# Patient Record
Sex: FEMALE | Race: ASIAN | NOT HISPANIC OR LATINO | Employment: FULL TIME | URBAN - METROPOLITAN AREA
[De-identification: names, ages, dates, MRNs, and addresses within clinical notes are randomized per-mention and may not be internally consistent; named-entity substitution may affect disease eponyms.]

---

## 2017-01-20 ENCOUNTER — TRANSFERRED RECORDS (OUTPATIENT)
Dept: HEALTH INFORMATION MANAGEMENT | Facility: CLINIC | Age: 42
End: 2017-01-20

## 2017-02-03 ENCOUNTER — OFFICE VISIT (OUTPATIENT)
Dept: FAMILY MEDICINE | Facility: CLINIC | Age: 42
End: 2017-02-03
Payer: COMMERCIAL

## 2017-02-03 VITALS
DIASTOLIC BLOOD PRESSURE: 72 MMHG | HEART RATE: 65 BPM | BODY MASS INDEX: 18.96 KG/M2 | SYSTOLIC BLOOD PRESSURE: 112 MMHG | OXYGEN SATURATION: 99 % | HEIGHT: 63 IN | RESPIRATION RATE: 18 BRPM | TEMPERATURE: 97.9 F | WEIGHT: 107 LBS

## 2017-02-03 DIAGNOSIS — K13.70: Primary | ICD-10-CM

## 2017-02-03 PROCEDURE — 99214 OFFICE O/P EST MOD 30 MIN: CPT | Performed by: FAMILY MEDICINE

## 2017-02-03 NOTE — PATIENT INSTRUCTIONS
1. You have a collection of cholesterol in a small gland in the skin or mucosa of the right vaginal opening    And it's gotten big enough to be irritating and probably got bumped and is infected     2. Warm soaks for 10 min 2 times a day  Press it dry     2. Ask insurance if will cost to remove and can schedule

## 2017-02-03 NOTE — MR AVS SNAPSHOT
"              After Visit Summary   2/3/2017    Albania Forrest    MRN: [id number]           Patient Information     Date Of Birth          1975        Visit Information        Provider Department      2/3/2017 9:40 AM Liana Mar MD [facility]        Today's Diagnoses     Papule of mucosa Rt vaginal orifice     -  1       Care Instructions    1. You have a collection of cholesterol in a small gland in the skin or mucosa of the right vaginal opening    And it's gotten big enough to be irritating and probably got bumped and is infected     2. Warm soaks for 10 min 2 times a day  Press it dry     2. Ask insurance if will cost to remove and can schedule         Follow-ups after your visit        Who to contact     If you have questions or need follow up information about today's clinic visit or your schedule please contact [facility] directly at [phone].  Normal or non-critical lab and imaging results will be communicated to you by MyChart, letter or phone within 4 business days after the clinic has received the results. If you do not hear from us within 7 days, please contact the clinic through MyChart or phone. If you have a critical or abnormal lab result, we will notify you by phone as soon as possible.  Submit refill requests through [employer] or call your pharmacy and they will forward the refill request to us. Please allow 3 business days for your refill to be completed.          Additional Information About Your Visit        Care EveryWhere ID     This is your Care EveryWhere ID. This could be used by other organizations to access your [facility] medical records  [id number]        Your Vitals Were     Pulse Temperature Respirations    65 97.9  F (36.6  C) (Tympanic) 18    Height BMI (Body Mass Index) Pulse Oximetry    5' 2.5\" (1.588 m) 19.25 kg/m2 99%    Last Period Breastfeeding?       (LMP Unknown) No        Blood Pressure from " Last 3 Encounters:   02/03/17 112/72   12/10/16 106/60   06/02/16 100/60    Weight from Last 3 Encounters:   02/03/17 107 lb (48.535 kg)   12/10/16 108 lb (48.988 kg)   06/02/16 107 lb (48.535 kg)              Today, you had the following     No orders found for display       Primary Care Provider    None Specified       No primary provider on file.        Thank you!     Thank you for choosing [facility]  for your care. Our goal is always to provide you with excellent care. Hearing back from our patients is one way we can continue to improve our services. Please take a few minutes to complete the written survey that you may receive in the mail after your visit with us. Thank you!             Your Updated Medication List - Protect others around you: Learn how to safely use, store and throw away your medicines at www.disposemymeds.org.          This list is accurate as of: 2/3/17 10:14 AM.  Always use your most recent med list.                   Brand Name Dispense Instructions for use    amoxicillin 500 MG capsule    AMOXIL    30 capsule    Take 1 capsule (500 mg) by mouth 3 times daily       sulfamethoxazole-trimethoprim 400-80 MG per tablet    BACTRIM/SEPTRA    6 tablet    Take 1 tablet by mouth 2 times daily       vitamin D 2000 UNITS Caps      Take 1 capsule by mouth daily

## 2017-02-03 NOTE — PROGRESS NOTES
"  SUBJECTIVE:                                                    Albania Forrest is a 41 year old female who presents to clinic today for the following health issues:   is here   Vaginal Symptoms      Duration: x 1 week    Description  Lesion in vaginal area- MARBLE  SIZE -tender     Intensity:  mild    Accompanying signs and symptoms (fever/dysuria/abdominal or back pain): Pain    History  Sexually active: yes, single partner, contraception not required  Possibility of pregnancy: No  Recent antibiotic use: no     Precipitating or alleviating factors: None    Therapies tried and outcome: none   Outcome: NA     Problem list and histories reviewed & adjusted, as indicated.  Additional history: as documented    Labs reviewed in EPIC  Problem list, Medication list, Allergies, and Medical/Social/Surgical histories reviewed in [facility] and updated as appropriate.    ROS:  C: NEGATIVE for fever, chills, change in weight  I: NEGATIVE for worrisome rashes, moles or lesions  E: NEGATIVE for vision changes or irritation  E/M: NEGATIVE for ear, mouth and throat problems  R: NEGATIVE for significant cough or SOB  B: NEGATIVE for masses, tenderness or discharge  CV: NEGATIVE for chest pain, palpitations or peripheral edema  GI: NEGATIVE for nausea, abdominal pain, heartburn, or change in bowel habits  : NEGATIVE for frequency, dysuria, or hematuria-- tender cyst on Rt labia   M: NEGATIVE for significant arthralgias or myalgia  N: NEGATIVE for weakness, dizziness or paresthesias  E: NEGATIVE for temperature intolerance, skin/hair changes  H: NEGATIVE for bleeding problems  P: NEGATIVE for changes in mood or affect    OBJECTIVE:                                                    /72 mmHg  Pulse 65  Temp(Src) 97.9  F (36.6  C) (Tympanic)  Resp 18  Ht 5' 2.5\" (1.588 m)  Wt 107 lb (48.535 kg)  BMI 19.25 kg/m2  SpO2 99%  LMP  (LMP Unknown)  Breastfeeding? No  Body mass index is 19.25 kg/(m^2).  GENERAL: healthy, alert " and no distress  EYES: Eyes grossly normal to inspection, PERRL and conjunctivae and sclerae normal  RESP: lungs clear to auscultation - no rales, rhonchi or wheezes  ABDOMEN: soft, nontender, no hepatosplenomegaly, no masses and bowel sounds normal   (female): normal female external genitalia, normal urethral meatus, vaginal mucosa pink, moist, well rugated,  Discharge--5-6 mm firm round slightly yellow under thinned skin on Rt ant vag orifice - slightly red and tender   MS: no gross musculoskeletal defects noted, no edema  SKIN: no suspicious lesions or rashes  NEURO: Normal strength and tone, mentation intact and speech normal  PSYCH: mentation appears normal, affect normal/bright    Diagnostic Test Results:  none      ASSESSMENT/PLAN:                                                              ICD-10-CM    1. Papule of mucosa Rt vaginal orifice  R23.8        Patient Instructions   1. You have a collection of cholesterol in a small gland in the skin or mucosa of the right vaginal opening    And it's gotten big enough to be irritating and probably got bumped and is infected     2. Warm soaks for 10 min 2 times a day  Press it dry     2. Ask insurance if will cost to remove and can schedule     discussed and explained thoroughly to  and pt  Re the etiology, and probable damage of cyst with intercourse causing the tenderness     Liana Mar MD  [facility]

## 2017-02-03 NOTE — NURSING NOTE
"Chief Complaint   Patient presents with     Vaginal Problem     /72 mmHg  Pulse 65  Temp(Src) 97.9  F (36.6  C) (Tympanic)  Resp 18  Ht 5' 2.5\" (1.588 m)  Wt 107 lb (48.535 kg)  BMI 19.25 kg/m2  SpO2 99%  LMP  (LMP Unknown)  Breastfeeding? No Estimated body mass index is 19.25 kg/(m^2) as calculated from the following:    Height as of this encounter: 5' 2.5\" (1.588 m).    Weight as of this encounter: 107 lb (48.535 kg).  BP completed using cuff size: roxie Salcedo CMA    There are no preventive care reminders to display for this patient.  Health Maintenance reviewed at today's visit patient asked to schedule/complete:   None, Health Maintenance up to date.      "

## 2017-04-28 ENCOUNTER — TRANSFERRED RECORDS (OUTPATIENT)
Dept: HEALTH INFORMATION MANAGEMENT | Facility: CLINIC | Age: 42
End: 2017-04-28

## 2017-04-28 LAB — HEP C HIM: NORMAL

## 2017-06-09 ENCOUNTER — HOSPITAL ENCOUNTER (OUTPATIENT)
Dept: MAMMOGRAPHY | Facility: CLINIC | Age: 42
Discharge: HOME | End: 2017-06-09
Attending: FAMILY MEDICINE | Admitting: FAMILY MEDICINE
Payer: COMMERCIAL

## 2017-06-09 ENCOUNTER — OFFICE VISIT (OUTPATIENT)
Dept: FAMILY MEDICINE | Facility: CLINIC | Age: 42
End: 2017-06-09
Payer: COMMERCIAL

## 2017-06-09 VITALS
SYSTOLIC BLOOD PRESSURE: 108 MMHG | HEIGHT: 63 IN | TEMPERATURE: 97.2 F | OXYGEN SATURATION: 99 % | HEART RATE: 58 BPM | RESPIRATION RATE: 16 BRPM | WEIGHT: 107 LBS | BODY MASS INDEX: 18.96 KG/M2 | DIASTOLIC BLOOD PRESSURE: 82 MMHG

## 2017-06-09 DIAGNOSIS — B18.2 CHRONIC HEPATITIS C WITHOUT HEPATIC COMA (H): Chronic | ICD-10-CM

## 2017-06-09 DIAGNOSIS — Z13.220 LIPID SCREENING: ICD-10-CM

## 2017-06-09 DIAGNOSIS — Z00.00 ROUTINE GENERAL MEDICAL EXAMINATION AT A HEALTH CARE FACILITY: Primary | ICD-10-CM

## 2017-06-09 DIAGNOSIS — E46 PROTEIN-CALORIE MALNUTRITION (H): ICD-10-CM

## 2017-06-09 DIAGNOSIS — Z82.49 FAMILY HISTORY OF ISCHEMIC HEART DISEASE: ICD-10-CM

## 2017-06-09 DIAGNOSIS — Z12.31 VISIT FOR SCREENING MAMMOGRAM: ICD-10-CM

## 2017-06-09 LAB
CHOLEST SERPL-MCNC: 225 MG/DL
HDLC SERPL-MCNC: 79 MG/DL
LDLC SERPL CALC-MCNC: 124 MG/DL
NONHDLC SERPL-MCNC: 146 MG/DL
TRIGL SERPL-MCNC: 110 MG/DL

## 2017-06-09 PROCEDURE — 80061 LIPID PANEL: CPT | Performed by: FAMILY MEDICINE

## 2017-06-09 PROCEDURE — 36415 COLL VENOUS BLD VENIPUNCTURE: CPT | Performed by: FAMILY MEDICINE

## 2017-06-09 PROCEDURE — 99396 PREV VISIT EST AGE 40-64: CPT | Performed by: FAMILY MEDICINE

## 2017-06-09 PROCEDURE — G0202 SCR MAMMO BI INCL CAD: HCPCS

## 2017-06-09 NOTE — MR AVS SNAPSHOT
After Visit Summary   6/9/2017    Albania Forrest    MRN: [id number]           Patient Information     Date Of Birth          1975        Visit Information        Provider Department      6/9/2017 8:40 AM Liana Mar MD [facility]        Today's Diagnoses     Routine general medical examination at a health care facility    -  1    Chronic hepatitis C without hepatic coma (H)        Protein-calorie malnutrition (H)-stable          Care Instructions      Preventive Health Recommendations  Female Ages 40 to 49    Yearly exam:     See your health care provider every year in order to  1. Review health changes.   2. Discuss preventive care.    3. Review your medicines if your doctor prescribed any.      Get a Pap test every three years (unless you have an abnormal result and your provider advises testing more often).      If you get Pap tests with HPV test, you only need to test every 5 years, unless you have an abnormal result. You do not need a Pap test if your uterus was removed (hysterectomy) and you have not had cancer.      You should be tested each year for STDs (sexually transmitted diseases), if you're at risk.       Ask your doctor if you should have a mammogram.      Have a colonoscopy (test for colon cancer) if someone in your family has had colon cancer or polyps before age 50.       Have a cholesterol test every 5 years.       Have a diabetes test (fasting glucose) after age 45. If you are at risk for diabetes, you should have this test every 3 years.    Shots: Get a flu shot each year. Get a tetanus shot every 10 years.     Nutrition:     Eat at least 5 servings of fruits and vegetables each day.    Eat whole-grain bread, whole-wheat pasta and brown rice instead of white grains and rice.    Talk to your provider about Calcium and Vitamin D.     Lifestyle    Exercise at least 150 minutes a week (an average of 30 minutes a day, 5 days a week).  This will help you control your weight and prevent disease.    Limit alcohol to one drink per day.    No smoking.     Wear sunscreen to prevent skin cancer.    See your dentist every six months for an exam and cleaning.    1. Please do your breast exam every mo, when you  Change the  calendar page or set an alarm on your cell phone Do a  visual check for dimples, inversion or indentation or any different position of the nipple Feel manually  for any 1cm or larger  size mass ie about the size of an almond Be sure to cover the entire area of both breasts : this extends back to the back on either side and from the collar bone to the bottom of the breasts where you can begin to feel ribs.    2. . Schedule your mammo at [facility]  At [address] [place] [address] at [phone]              Follow-ups after your visit        Your next 10 appointments already scheduled     Jun 09, 2017 11:00 AM CDT   MA SCREENING DIGITAL BILATERAL with SHBCMA2   [facility] ([facility])    [address] [place] [address], [address]  [facility] [place] [address] [phone]           Do not use any powder, lotion or deodorant under your arms or on your breast. If you do, we will ask you to remove it before your exam.  Wear comfortable, two-piece clothing.  If you have any allergies, tell your care team.  Bring any previous mammograms from other facilities or have them mailed to the breast center. This mammogram location, [facility], now offers 3D mammography. It doesn't replace a screening mammogram and can be done with a regular screening mammogram. It is optional and not all insurances will pay for it. 3D mammography is a special kind of mammogram that produces a three-dimensional image of the breast by using low dose-xrays. 3D allows the radiologist to see the breast tissue differently from 2D, which reduces the chance of repeat testing due to overlapping breast tissue. If you are  "interested in have a 3D mammogram, please check with your insurance before you arrive for your exam. On the day of your exam you will be asked if you would like 3D imaging.              Who to contact     If you have questions or need follow up information about today's clinic visit or your schedule please contact [facility] directly at [phone].  Normal or non-critical lab and imaging results will be communicated to you by MyChart, letter or phone within 4 business days after the clinic has received the results. If you do not hear from us within 7 days, please contact the clinic through MyChart or phone. If you have a critical or abnormal lab result, we will notify you by phone as soon as possible.  Submit refill requests through [employer] or call your pharmacy and they will forward the refill request to us. Please allow 3 business days for your refill to be completed.          Additional Information About Your Visit        Care EveryWhere ID     This is your Care EveryWhere ID. This could be used by other organizations to access your [facility] medical records  [id number]        Your Vitals Were     Pulse Temperature Respirations Height Last Period Pulse Oximetry    58 97.2  F (36.2  C) (Tympanic) 16 5' 2.5\" (1.588 m) (LMP Unknown) 99%    Breastfeeding? BMI (Body Mass Index)                No 19.26 kg/m2           Blood Pressure from Last 3 Encounters:   06/09/17 108/82   02/03/17 112/72   12/10/16 106/60    Weight from Last 3 Encounters:   06/09/17 107 lb (48.5 kg)   02/03/17 107 lb (48.5 kg)   12/10/16 108 lb (49 kg)              Today, you had the following     No orders found for display       Primary Care Provider    None Specified       No primary provider on file.        Thank you!     Thank you for choosing [facility]  for your care. Our goal is always to provide you with excellent care. Hearing back from our patients is one way we can continue " to improve our services. Please take a few minutes to complete the written survey that you may receive in the mail after your visit with us. Thank you!             Your Updated Medication List - Protect others around you: Learn how to safely use, store and throw away your medicines at www.disposemymeds.org.          This list is accurate as of: 6/9/17  9:18 AM.  Always use your most recent med list.                   Brand Name Dispense Instructions for use    vitamin D 2000 UNITS Caps      Take 1 capsule by mouth daily

## 2017-06-09 NOTE — LETTER
"    6/13/2017     Albania Forrest  [address] [place] [address]    Dear [place]ia:    Here are the results of your latest lipid tests:    LDL Cholesterol Calculated   Date Value Ref Range Status   06/09/2017 124 (H) <100 mg/dL Final     Comment:     Above desirable:  100-129 mg/dl   Borderline High:  130-159 mg/dL   High:             160-189 mg/dL   Very high:       >189 mg/dl     ]  HDL Cholesterol   Date Value Ref Range Status   06/09/2017 79 >49 mg/dL Final   ]  Triglycerides   Date Value Ref Range Status   06/09/2017 110 <150 mg/dL Final     Comment:     Fasting specimen   ]  Cholesterol   Date Value Ref Range Status   06/09/2017 225 (H) <200 mg/dL Final     Comment:     Desirable:       <200 mg/dl   ]    LDL is the \"bad\" cholesterol linked to heart disease and stroke.   HDL is the \"good\" choesterol and when it is high, it decreases the risk for above problems.  ###enclosed is Mediterranean Diet####  Follow a low-fat, low-cholesterol diet and get regular exercise.  Please feel free to call the clinic if you have any questions.    Sincerely,      Liana Mar   "

## 2017-06-09 NOTE — NURSING NOTE
"Chief Complaint   Patient presents with     Physical     /82  Pulse 58  Temp 97.2  F (36.2  C) (Tympanic)  Resp 16  Ht 5' 2.5\" (1.588 m)  Wt 107 lb (48.5 kg)  LMP  (LMP Unknown)  SpO2 99%  Breastfeeding? No  BMI 19.26 kg/m2 Estimated body mass index is 19.26 kg/(m^2) as calculated from the following:    Height as of this encounter: 5' 2.5\" (1.588 m).    Weight as of this encounter: 107 lb (48.5 kg).  BP completed using cuff size: regular   Jessika Salcedo CMA    There are no preventive care reminders to display for this patient.  Health Maintenance reviewed at today's visit patient asked to schedule/complete:   None, Health Maintenance up to date.    "

## 2017-06-09 NOTE — PROGRESS NOTES
SUBJECTIVE:     CC: [place]ia Forrest is an 42 year old woman who presents for preventive health visit.     Healthy Habits:    Do you get at least three servings of calcium containing foods daily (dairy, green leafy vegetables, etc.)? yes    Amount of exercise or daily activities, outside of work: 5 day(s) per week    Problems taking medications regularly No    Medication side effects: No    Have you had an eye exam in the past two years? yes    Do you see a dentist twice per year? yes    Do you have sleep apnea, excessive snoring or daytime drowsiness?no        HEPATITIS C     -+ test in 2015   -Rx [facility] per [facility]   -5-17 : neg LFTs and 0 viral count!!   -final status: cured       Today's PHQ-2 Score:   PHQ-2 ( 1999 Pf[employer]) 6/9/2017 2/3/2017   Q1: Little interest or pleasure in doing things 0 0   Q2: Feeling down, depressed or hopeless 0 0   PHQ-2 Score 0 0       Abuse: Current or Past(Physical, Sexual or Emotional)- No  Do you feel safe in your environment - Yes    Social History   Substance Use Topics     Smoking status: Never Smoker     Smokeless tobacco: Never Used     Alcohol use No     The patient does not drink >3 drinks per day nor >7 drinks per week.    Recent Labs   Lab Test  04/27/13   1017  02/24/11   1734   CHOL  173  172   HDL  53  51   LDL  103  108.2*   TRIG  87  64   CHOLHDLRATIO  3.3   --        Reviewed orders with patient.  Reviewed health maintenance and updated orders accordingly - Yes    Mammo Decision Support:  Patient under age 50, mutual decision reflected in health maintenance.      Pertinent mammograms are reviewed under the imaging tab.  History of abnormal Pap smear: NO - age 30- 65 PAP every 3 years recommended    Reviewed and updated as needed this visit by clinical staff  Tobacco  Allergies  Meds  Problems  Med Hx  Surg Hx  Fam Hx  Soc Hx          Reviewed and updated as needed this visit by Provider            ROS:  C: NEGATIVE for fever, chills, change in weight  I:  NEGATIVE for worrisome rashes, moles or lesions  E: NEGATIVE for vision changes or irritation  ENT: NEGATIVE for ear, mouth and throat problems  R: NEGATIVE for significant cough or SOB  B: NEGATIVE for masses, tenderness or discharge  CV: NEGATIVE for chest pain, palpitations or peripheral edema  GI: NEGATIVE for nausea, abdominal pain, heartburn, or change in bowel habits  : NEGATIVE for unusual urinary or vaginal symptoms. Periods are regular.  M: NEGATIVE for significant arthralgias or myalgia  N: NEGATIVE for weakness, dizziness or paresthesias  P: NEGATIVE for changes in mood or affect    Problem list, Medication list, Allergies, and Medical/Social/Surgical histories reviewed in [facility] and updated as appropriate.  Labs reviewed in EPIC  OBJECTIVE:     LMP  (LMP Unknown)  EXAM:  GENERAL: healthy, alert, no distress and under wt   EYES: Eyes grossly normal to inspection, PERRL and conjunctivae and sclerae normal  NECK: no adenopathy, no asymmetry, masses, or scars and thyroid normal to palpation  RESP: lungs clear to auscultation - no rales, rhonchi or wheezes  BREAST: normal without masses, tenderness or nipple discharge and no palpable axillary masses or adenopathy  CV: regular rate and rhythm, normal S1 S2, no S3 or S4, no murmur, click or rub, no peripheral edema and peripheral pulses strong  ABDOMEN: soft, nontender, no hepatosplenomegaly, no masses and bowel sounds normal  MS: no gross musculoskeletal defects noted, no edema  SKIN: no suspicious lesions or rashes  NEURO: Normal strength and tone, mentation intact and speech normal  PSYCH: mentation appears normal, affect normal/bright  LYMPH: no cervical, supraclavicular, axillary, or inguinal adenopathy    ASSESSMENT/PLAN:         ICD-10-CM    1. Routine general medical examination at a health care facility Z00.00    2. Chronic hepatitis C without hepatic coma (H) B18.2    3. Protein-calorie malnutrition (H)-stable E46        COUNSELING:   Reviewed  "preventive health counseling, as reflected in patient instructions       Regular exercise       Healthy diet/nutrition       Vision screening         reports that she has never smoked. She has never used smokeless tobacco.    Estimated body mass index is 19.26 kg/(m^2) as calculated from the following:    Height as of 2/3/17: 5' 2.5\" (1.588 m).    Weight as of 2/3/17: 107 lb (48.5 kg).   Weight management plan noted, stable and monitoring     Patient Instructions     Preventive Health Recommendations  Female Ages 40 to 49    Yearly exam:     See your health care provider every year in order to  1. Review health changes.   2. Discuss preventive care.    3. Review your medicines if your doctor prescribed any.      Get a Pap test every three years (unless you have an abnormal result and your provider advises testing more often).      If you get Pap tests with HPV test, you only need to test every 5 years, unless you have an abnormal result. You do not need a Pap test if your uterus was removed (hysterectomy) and you have not had cancer.      You should be tested each year for STDs (sexually transmitted diseases), if you're at risk.       Ask your doctor if you should have a mammogram.      Have a colonoscopy (test for colon cancer) if someone in your family has had colon cancer or polyps before age 50.       Have a cholesterol test every 5 years.       Have a diabetes test (fasting glucose) after age 45. If you are at risk for diabetes, you should have this test every 3 years.    Shots: Get a flu shot each year. Get a tetanus shot every 10 years.     Nutrition:     Eat at least 5 servings of fruits and vegetables each day.    Eat whole-grain bread, whole-wheat pasta and brown rice instead of white grains and rice.    Talk to your provider about Calcium and Vitamin D.     Lifestyle    Exercise at least 150 minutes a week (an average of 30 minutes a day, 5 days a week). This will help you control your weight and prevent " disease.    Limit alcohol to one drink per day.    No smoking.     Wear sunscreen to prevent skin cancer.    See your dentist every six months for an exam and cleaning.    1. Please do your breast exam every mo, when you  Change the  calendar page or set an alarm on your cell phone Do a  visual check for dimples, inversion or indentation or any different position of the nipple Feel manually  for any 1cm or larger  size mass ie about the size of an almond Be sure to cover the entire area of both breasts : this extends back to the back on either side and from the collar bone to the bottom of the breasts where you can begin to feel ribs.    2. . Schedule your mammo at [facility]  At [address] at [phone]            Counseling Resources:  ATP IV Guidelines  Pooled Cohorts Equation Calculator  Breast Cancer Risk Calculator  FRAX Risk Assessment  ICSI Preventive Guidelines  Dietary Guidelines for Americans, 2010  USDA's MyPlate  ASA Prophylaxis  Lung CA Screening    Liana Mar MD  [facility]

## 2017-06-09 NOTE — PATIENT INSTRUCTIONS
Preventive Health Recommendations  Female Ages 40 to 49    Yearly exam:     See your health care provider every year in order to  1. Review health changes.   2. Discuss preventive care.    3. Review your medicines if your doctor prescribed any.      Get a Pap test every three years (unless you have an abnormal result and your provider advises testing more often).      If you get Pap tests with HPV test, you only need to test every 5 years, unless you have an abnormal result. You do not need a Pap test if your uterus was removed (hysterectomy) and you have not had cancer.      You should be tested each year for STDs (sexually transmitted diseases), if you're at risk.       Ask your doctor if you should have a mammogram.      Have a colonoscopy (test for colon cancer) if someone in your family has had colon cancer or polyps before age 50.       Have a cholesterol test every 5 years.       Have a diabetes test (fasting glucose) after age 45. If you are at risk for diabetes, you should have this test every 3 years.    Shots: Get a flu shot each year. Get a tetanus shot every 10 years.     Nutrition:     Eat at least 5 servings of fruits and vegetables each day.    Eat whole-grain bread, whole-wheat pasta and brown rice instead of white grains and rice.    Talk to your provider about Calcium and Vitamin D.     Lifestyle    Exercise at least 150 minutes a week (an average of 30 minutes a day, 5 days a week). This will help you control your weight and prevent disease.    Limit alcohol to one drink per day.    No smoking.     Wear sunscreen to prevent skin cancer.    See your dentist every six months for an exam and cleaning.    1. Please do your breast exam every mo, when you  Change the  calendar page or set an alarm on your cell phone Do a  visual check for dimples, inversion or indentation or any different position of the nipple Feel manually  for any 1cm or larger  size mass ie about the size of an almond Be sure to  cover the entire area of both breasts : this extends back to the back on either side and from the collar bone to the bottom of the breasts where you can begin to feel ribs.    2. . Schedule your mammo at [facility]  At [address] at [phone]

## 2018-05-11 ENCOUNTER — OFFICE VISIT (OUTPATIENT)
Dept: FAMILY MEDICINE | Facility: CLINIC | Age: 43
End: 2018-05-11
Payer: COMMERCIAL

## 2018-05-11 VITALS
HEIGHT: 63 IN | RESPIRATION RATE: 16 BRPM | HEART RATE: 54 BPM | DIASTOLIC BLOOD PRESSURE: 60 MMHG | WEIGHT: 108 LBS | SYSTOLIC BLOOD PRESSURE: 110 MMHG | BODY MASS INDEX: 19.14 KG/M2 | TEMPERATURE: 97.7 F

## 2018-05-11 DIAGNOSIS — Z13.220 LIPID SCREENING: ICD-10-CM

## 2018-05-11 DIAGNOSIS — Z00.00 ROUTINE GENERAL MEDICAL EXAMINATION AT A HEALTH CARE FACILITY: Primary | ICD-10-CM

## 2018-05-11 DIAGNOSIS — Z11.4 SCREENING FOR HIV (HUMAN IMMUNODEFICIENCY VIRUS): ICD-10-CM

## 2018-05-11 DIAGNOSIS — Z13.1 SCREENING FOR DIABETES MELLITUS (DM): ICD-10-CM

## 2018-05-11 DIAGNOSIS — L98.9 SKIN LESION: ICD-10-CM

## 2018-05-11 LAB
CHOLEST SERPL-MCNC: 185 MG/DL
GLUCOSE SERPL-MCNC: 83 MG/DL (ref 70–99)
HDLC SERPL-MCNC: 68 MG/DL
HIV 1+2 AB+HIV1 P24 AG SERPL QL IA: NONREACTIVE
LDLC SERPL CALC-MCNC: 99 MG/DL
NONHDLC SERPL-MCNC: 117 MG/DL
TRIGL SERPL-MCNC: 91 MG/DL

## 2018-05-11 PROCEDURE — 36415 COLL VENOUS BLD VENIPUNCTURE: CPT | Performed by: PHYSICIAN ASSISTANT

## 2018-05-11 PROCEDURE — 82947 ASSAY GLUCOSE BLOOD QUANT: CPT | Performed by: PHYSICIAN ASSISTANT

## 2018-05-11 PROCEDURE — 87389 HIV-1 AG W/HIV-1&-2 AB AG IA: CPT | Performed by: PHYSICIAN ASSISTANT

## 2018-05-11 PROCEDURE — 99396 PREV VISIT EST AGE 40-64: CPT | Performed by: PHYSICIAN ASSISTANT

## 2018-05-11 PROCEDURE — 80061 LIPID PANEL: CPT | Performed by: PHYSICIAN ASSISTANT

## 2018-05-11 ASSESSMENT — ENCOUNTER SYMPTOMS
NEUROLOGICAL NEGATIVE: 1
PSYCHIATRIC NEGATIVE: 1
MUSCULOSKELETAL NEGATIVE: 1
GASTROINTESTINAL NEGATIVE: 1
ALLERGIC/IMMUNOLOGIC NEGATIVE: 1
RESPIRATORY NEGATIVE: 1
CONSTITUTIONAL NEGATIVE: 1
CARDIOVASCULAR NEGATIVE: 1
HEMATOLOGIC/LYMPHATIC NEGATIVE: 1
EYES NEGATIVE: 1
ENDOCRINE NEGATIVE: 1

## 2018-05-11 NOTE — PROGRESS NOTES
Answers for HPI/ROS submitted by the patient on 5/11/2018   Annual Exam:  Getting at least 3 servings of Calcium per day:: NO  Bi-annual eye exam:: NO  Dental care twice a year:: NO  Sleep apnea or symptoms of sleep apnea:: None  Diet:: Regular (no restrictions), Gluten-free/reduced  Frequency of exercise:: 2-3 days/week  Taking medications regularly:: Yes  Medication side effects:: None  Additional concerns today:: No  PHQ-2 Score: 0  Duration of exercise:: Less than 15 minutes

## 2018-05-11 NOTE — MR AVS SNAPSHOT
After Visit Summary   5/11/2018    Albania Forrest    MRN: [id number]           Patient Information     Date Of Birth          1975        Visit Information        Provider Department      5/11/2018 8:50 AM Brandy Akins PA-C [facility]        Today's Diagnoses     Routine general medical examination at a health care facility    -  1    Lipid screening        Screening for HIV (human immunodeficiency virus)        Screening for diabetes mellitus (DM)          Care Instructions    1. I will contact you with lab results when they are complete.     2. Keep up the good work with diet and exercise!!      Preventive Health Recommendations  Female Ages 40 to 49    Yearly exam:     See your health care provider every year in order to  1. Review health changes.   2. Discuss preventive care.    3. Review your medicines if your doctor prescribed any.      Since you had a hysterectomy, you no longer need to have Pap smears      You should be tested each year for STDs (sexually transmitted diseases), if you're at risk.       Next year, you are due for another mammogram.  Continue self breast exams once a month, and in if anything feels concerning or changes.       Have a colonoscopy (test for colon cancer) if someone in your family has had colon cancer or polyps before age 50.       Have a cholesterol test every 5 years.       Have a diabetes test (fasting glucose) after age 45. If you are at risk for diabetes, you should have this test every 3 years.    Shots: Get a flu shot each year. Get a tetanus shot every 10 years.     Nutrition:     Eat at least 5 servings of fruits and vegetables each day.    Eat whole-grain bread, whole-wheat pasta and brown rice instead of white grains and rice.    Talk to your provider about Calcium and Vitamin D.     Lifestyle    Exercise at least 150 minutes a week (an average of 30 minutes a day, 5 days a week). This will help you control  "your weight and prevent disease.    Limit alcohol to one drink per day.    No smoking.     Wear sunscreen to prevent skin cancer.    See your dentist every six months for an exam and cleaning.          Follow-ups after your visit        Who to contact     If you have questions or need follow up information about today's clinic visit or your schedule please contact [facility] directly at [phone].  Normal or non-critical lab and imaging results will be communicated to you by [email or web address], letter or phone within 4 business days after the clinic has received the results. If you do not hear from us within 7 days, please contact the clinic through [email or web address] or phone. If you have a critical or abnormal lab result, we will notify you by phone as soon as possible.  Submit refill requests through Xceedium or call your pharmacy and they will forward the refill request to us. Please allow 3 business days for your refill to be completed.          Additional Information About Your Visit        [email or web address] Information     Xceedium lets you send messages to your doctor, view your test results, renew your prescriptions, schedule appointments and more. To sign up, go to [email or web address] . Click on \"Log in\" on the left side of the screen, which will take you to the Welcome page. Then click on \"Sign up Now\" on the right side of the page.     You will be asked to enter the access code listed below, as well as some personal information. Please follow the directions to create your username and password.     Your access code is: [email or web address]  Expires: 2018  9:38 AM     Your access code will  in 90 days. If you need help or a new code, please call your [place] clinic or [phone].        Care EveryWhere ID     This is your Care EveryWhere ID. This could be used by other organizations to access your [place] medical records  [id number]        Your Vitals Were     Pulse Temperature Respirations " "Height Last Period BMI (Body Mass Index)    54 97.7  F (36.5  C) (Tympanic) 16 5' 2.75\" (1.594 m) (LMP Unknown) 19.28 kg/m2       Blood Pressure from Last 3 Encounters:   05/11/18 110/60   06/09/17 108/82   02/03/17 112/72    Weight from Last 3 Encounters:   05/11/18 108 lb (49 kg)   06/09/17 107 lb (48.5 kg)   02/03/17 107 lb (48.5 kg)              We Performed the Following     GLUCOSE     HIV Antigen Antibody Combo     Lipid panel reflex to direct LDL Non-fasting        Primary Care Provider Fax #    Physician No Ref-Primary [phone]       No address on file        Equal Access to Services     ONEAL JACKSON : Lm Kang, walcif almeida, pauline kaalmanori yancey, helio weber . So [facility] [phone].    ATENCIÓN: Si habla español, tiene a rachel disposición servicios gratuitos de asistencia lingüística. Llame al [phone].    We comply with applicable federal civil rights laws and [place] laws. We do not discriminate on the basis of race, color, national origin, age, disability, sex, sexual orientation, or gender identity.            Thank you!     Thank you for choosing [facility]  for your care. Our goal is always to provide you with excellent care. Hearing back from our patients is one way we can continue to improve our services. Please take a few minutes to complete the written survey that you may receive in the mail after your visit with us. Thank you!             Your Updated Medication List - Protect others around you: Learn how to safely use, store and throw away your medicines at www.disposemymeds.org.          This list is accurate as of 5/11/18  9:38 AM.  Always use your most recent med list.                   Brand Name Dispense Instructions for use Diagnosis    vitamin D 2000 units Caps      Take 1 capsule by mouth daily          "

## 2018-05-11 NOTE — PROGRESS NOTES
SUBJECTIVE:   CC: Albania Forrest is an 43 year old woman who presents for preventive health visit.     Physical   Annual:     Getting at least 3 servings of Calcium per day::  NO    Bi-annual eye exam::  NO    Dental care twice a year::  NO    Sleep apnea or symptoms of sleep apnea::  None    Diet::  Regular (no restrictions) and Gluten-free/reduced    Frequency of exercise::  2-3 days/week    Duration of exercise::  Less than 15 minutes    Taking medications regularly::  Yes    Medication side effects::  None    Additional concerns today::  No              Today's PHQ-2 Score:   PHQ-2 ( 1999 [employer]) 5/11/2018   Q1: Little interest or pleasure in doing things 0   Q2: Feeling down, depressed or hopeless 0   PHQ-2 Score 0   Q1: Little interest or pleasure in doing things Not at all   Q2: Feeling down, depressed or hopeless Not at all   PHQ-2 Score 0       Abuse: Current or Past(Physical, Sexual or Emotional)- No  Do you feel safe in your environment - Yes    Social History   Substance Use Topics     Smoking status: Never Smoker     Smokeless tobacco: Never Used     Alcohol use No     Alcohol Use 5/11/2018   If you drink alcohol do you typically have greater than 3 drinks per day OR greater than 7 drinks per week? Not Applicable   No flowsheet data found.    Reviewed orders with patient.  Reviewed health maintenance and updated orders accordingly - Yes      Patient under age 50, mutual decision reflected in health maintenance.      Pertinent mammograms are reviewed under the imaging tab - normal mammogram last year, not due for another year   History of abnormal Pap smear: no, history of hysterectomy 2010    Reviewed and updated as needed this visit by clinical staff  Tobacco  Allergies  Meds  Med Hx  Surg Hx  Fam Hx  Soc Hx        Reviewed and updated as needed this visit by Provider        Past Medical History:   Diagnosis Date     Hepatitis C     Treatment successful, considered cured April 2017      Past  Surgical History:   Procedure Laterality Date     ABDOMEN SURGERY  10/2006    liver biopsy - Hep C      HYSTERECTOMY, PAP NO LONGER INDICATED  06/24/10    vaginal-for uterine prolapse  left ovaries, no cervix     Family History   Problem Relation Age of Onset     HEART DISEASE Father      CANCER Father      Unknown/Adopted Mother      DIABETES No family hx of      Coronary Artery Disease No family hx of      Hypertension No family hx of      Hyperlipidemia No family hx of      CEREBROVASCULAR DISEASE No family hx of      Breast Cancer No family hx of      Colon Cancer No family hx of      Prostate Cancer No family hx of      Other Cancer No family hx of      Depression No family hx of      Anxiety Disorder No family hx of      MENTAL ILLNESS No family hx of      Substance Abuse No family hx of      Anesthesia Reaction No family hx of      Asthma No family hx of      OSTEOPOROSIS No family hx of      Genetic Disorder No family hx of      Thyroid Disease No family hx of      Obesity No family hx of          Review of Systems   Constitutional: Negative.    HENT: Negative.    Eyes: Negative.    Respiratory: Negative.    Cardiovascular: Negative.    Gastrointestinal: Negative.    Endocrine: Negative.    Genitourinary: Negative.    Musculoskeletal: Negative.    Skin:        Lesion on right anterior thigh - unchanged for many years   Allergic/Immunologic: Negative.    Neurological: Negative.    Hematological: Negative.    Psychiatric/Behavioral: Negative.      Review of Systems   Constitutional: Negative.    HENT: Negative.    Eyes: Negative.    Respiratory: Negative.    Cardiovascular: Negative.    Gastrointestinal: Negative.    Endocrine: Negative.    Genitourinary: Negative.    Musculoskeletal: Negative.    Skin:        Lesion on right anterior thigh - unchanged for many years   Allergic/Immunologic: Negative.    Neurological: Negative.    Hematological: Negative.    Psychiatric/Behavioral: Negative.          "  OBJECTIVE:   /60 (Cuff Size: Adult Regular)  Pulse 54  Temp 97.7  F (36.5  C) (Tympanic)  Resp 16  Ht 5' 2.75\" (1.594 m)  Wt 108 lb (49 kg)  LMP  (LMP Unknown)  BMI 19.28 kg/m2  Physical Exam   Constitutional: She is oriented to person, place, and time. She appears well-developed and well-nourished. No distress.   HENT:   Right Ear: Tympanic membrane and external ear normal.   Left Ear: Tympanic membrane and external ear normal.   Nose: Nose normal.   Mouth/Throat: Oropharynx is clear and moist. No oropharyngeal exudate.   Eyes: Conjunctivae are normal. Pupils are equal, round, and reactive to light. Right eye exhibits no discharge. Left eye exhibits no discharge.   Neck: Neck supple. No tracheal deviation present. No thyromegaly present.   Cardiovascular: Normal rate, regular rhythm, S1 normal, S2 normal, normal heart sounds and normal pulses.  Exam reveals no S3, no S4 and no friction rub.    No murmur heard.  Pulmonary/Chest: Effort normal and breath sounds normal. No respiratory distress. She has no wheezes. She has no rales.   Abdominal: Soft. Bowel sounds are normal. She exhibits no mass. There is no hepatosplenomegaly. There is no tenderness.   Genitourinary: No breast swelling, tenderness or discharge.   Musculoskeletal: Normal range of motion. She exhibits no edema.   Lymphadenopathy:     She has no cervical adenopathy.   Neurological: She is alert and oriented to person, place, and time. She has normal strength and normal reflexes. She exhibits normal muscle tone.   Skin: Skin is warm and dry. Lesion (right anterior thigh - symmetric, grey, regular borders, raised, nontender, no scale or drainage, diameter 4mm (per patient has not changed in many years)) noted. No rash noted.   Psychiatric: She has a normal mood and affect. Judgment and thought content normal. Cognition and memory are normal.       ASSESSMENT/PLAN:       ICD-10-CM    1. Routine general medical examination at a [facility] " "facility Z00.00    2. Lipid screening Z13.220 Lipid panel reflex to direct LDL Non-fasting   3. Screening for HIV (human immunodeficiency virus) Z11.4 HIV Antigen Antibody Combo   4. Screening for diabetes mellitus (DM) Z13.1 GLUCOSE   5. Skin lesion L98.9 This appears benign, patient will continue to monitor for any changes or any new skin lesions        COUNSELING:  Reviewed preventive health counseling, as reflected in patient instructions     reports that she has never smoked. She has never used smokeless tobacco.    Estimated body mass index is 19.28 kg/(m^2) as calculated from the following:    Height as of this encounter: 5' 2.75\" (1.594 m).    Weight as of this encounter: 108 lb (49 kg).         Patient Instructions   1. I will contact you with lab results when they are complete.     2. Keep up the good work with diet and exercise!!      Preventive Health Recommendations  Female Ages 40 to 49    Yearly exam:     See your health care provider every year in order to  1. Review health changes.   2. Discuss preventive care.    3. Review your medicines if your doctor prescribed any.      Since you had a hysterectomy, you no longer need to have Pap smears      You should be tested each year for STDs (sexually transmitted diseases), if you're at risk.     Next year, you are due for another mammogram.  Continue self breast exams once a month, and come in if anything feels concerning or changes.       Have a colonoscopy (test for colon cancer) if someone in your family has had colon cancer or polyps before age 50.       Have a cholesterol test every 5 years.       Have a diabetes test (fasting glucose) after age 45. If you are at risk for diabetes, you should have this test every 3 years.    Shots: Get a flu shot each year. Get a tetanus shot every 10 years.     Nutrition:     Eat at least 5 servings of fruits and vegetables each day.    Eat whole-grain bread, whole-wheat pasta and brown rice instead of white grains " and rice.    Talk to your provider about Calcium and Vitamin D.     Lifestyle    Exercise at least 150 minutes a week (an average of 30 minutes a day, 5 days a week). This will help you control your weight and prevent disease.    Limit alcohol to one drink per day.    No smoking.     Wear sunscreen to prevent skin cancer.    See your dentist every six months for an exam and cleaning.      Goran Akins PA-C  [facility]

## 2018-05-11 NOTE — LETTER
May 14, 2018      Albania Forrest  [address]  [place] [address]        Dear ,    We are writing to inform you of your test results.    Your test results fall within the expected range(s) or remain unchanged from previous results.  Please continue with current treatment plan.    Resulted Orders   GLUCOSE   Result Value Ref Range    Glucose 83 70 - 99 mg/dL      Comment:      Non Fasting   HIV Antigen Antibody Combo   Result Value Ref Range    HIV Antigen Antibody Combo Nonreactive NR^Nonreactive          Comment:      HIV-1 p24 Ag & HIV-1/HIV-2 Ab Not Detected   Lipid panel reflex to direct LDL Non-fasting   Result Value Ref Range    Cholesterol 185 <200 mg/dL    Triglycerides 91 <150 mg/dL      Comment:      Non Fasting    HDL Cholesterol 68 >49 mg/dL    LDL Cholesterol Calculated 99 <100 mg/dL      Comment:      Desirable:       <100 mg/dl    Non HDL Cholesterol 117 <130 mg/dL       If you have any questions or concerns, please call the clinic at the number listed above.       Sincerely, Goran Akins PA-C

## 2018-05-11 NOTE — PATIENT INSTRUCTIONS
1. I will contact you with lab results when they are complete.     2. Keep up the good work with diet and exercise!!      Preventive Health Recommendations  Female Ages 40 to 49    Yearly exam:     See your health care provider every year in order to  1. Review health changes.   2. Discuss preventive care.    3. Review your medicines if your doctor prescribed any.      Since you had a hysterectomy, you no longer need to have Pap smears      You should be tested each year for STDs (sexually transmitted diseases), if you're at risk.     Next year, you are due for another mammogram.  Continue self breast exams once a month, and come in if anything feels concerning or changes.       Have a colonoscopy (test for colon cancer) if someone in your family has had colon cancer or polyps before age 50.       Have a cholesterol test every 5 years.       Have a diabetes test (fasting glucose) after age 45. If you are at risk for diabetes, you should have this test every 3 years.    Shots: Get a flu shot each year. Get a tetanus shot every 10 years.     Nutrition:     Eat at least 5 servings of fruits and vegetables each day.    Eat whole-grain bread, whole-wheat pasta and brown rice instead of white grains and rice.    Talk to your provider about Calcium and Vitamin D.     Lifestyle    Exercise at least 150 minutes a week (an average of 30 minutes a day, 5 days a week). This will help you control your weight and prevent disease.    Limit alcohol to one drink per day.    No smoking.     Wear sunscreen to prevent skin cancer.    See your dentist every six months for an exam and cleaning.

## 2019-05-30 ENCOUNTER — OFFICE VISIT (OUTPATIENT)
Dept: FAMILY MEDICINE | Facility: CLINIC | Age: 44
End: 2019-05-30
Payer: COMMERCIAL

## 2019-05-30 VITALS
HEART RATE: 61 BPM | BODY MASS INDEX: 20.24 KG/M2 | DIASTOLIC BLOOD PRESSURE: 60 MMHG | WEIGHT: 110 LBS | HEIGHT: 62 IN | TEMPERATURE: 98.7 F | RESPIRATION RATE: 16 BRPM | OXYGEN SATURATION: 99 % | SYSTOLIC BLOOD PRESSURE: 100 MMHG

## 2019-05-30 DIAGNOSIS — Z00.00 ROUTINE GENERAL MEDICAL EXAMINATION AT A HEALTH CARE FACILITY: Primary | ICD-10-CM

## 2019-05-30 DIAGNOSIS — E78.00 HYPERCHOLESTEROLEMIA: ICD-10-CM

## 2019-05-30 DIAGNOSIS — B18.2 CHRONIC HEPATITIS C WITHOUT HEPATIC COMA (H): ICD-10-CM

## 2019-05-30 DIAGNOSIS — Z13.1 SCREENING FOR DIABETES MELLITUS (DM): ICD-10-CM

## 2019-05-30 DIAGNOSIS — Z82.49 FAMILY HISTORY OF ISCHEMIC HEART DISEASE: ICD-10-CM

## 2019-05-30 PROCEDURE — 82947 ASSAY GLUCOSE BLOOD QUANT: CPT | Performed by: FAMILY MEDICINE

## 2019-05-30 PROCEDURE — 36415 COLL VENOUS BLD VENIPUNCTURE: CPT | Performed by: FAMILY MEDICINE

## 2019-05-30 PROCEDURE — 84460 ALANINE AMINO (ALT) (SGPT): CPT | Performed by: FAMILY MEDICINE

## 2019-05-30 PROCEDURE — 99396 PREV VISIT EST AGE 40-64: CPT | Performed by: FAMILY MEDICINE

## 2019-05-30 PROCEDURE — 80061 LIPID PANEL: CPT | Performed by: FAMILY MEDICINE

## 2019-05-30 RX ORDER — IBUPROFEN 800 MG/1
TABLET, FILM COATED ORAL
Refills: 0 | COMMUNITY
Start: 2019-05-11 | End: 2021-06-04

## 2019-05-30 ASSESSMENT — MIFFLIN-ST. JEOR: SCORE: 1107.21

## 2019-05-30 NOTE — NURSING NOTE
"Chief Complaint   Patient presents with     Physical     /60   Pulse 61   Temp 98.7  F (37.1  C) (Tympanic)   Resp 16   Ht 1.575 m (5' 2\")   Wt 49.9 kg (110 lb)   LMP  (LMP Unknown)   SpO2 99%   Breastfeeding? No   BMI 20.12 kg/m   Estimated body mass index is 20.12 kg/m  as calculated from the following:    Height as of this encounter: 1.575 m (5' 2\").    Weight as of this encounter: 49.9 kg (110 lb).  BP completed using cuff size: regular   Jessika Salcedo CMA    There are no preventive care reminders to display for this patient.  Health Maintenance reviewed at today's visit patient asked to schedule/complete:   Routine Health Visit: Completed at today's visit.    "

## 2019-05-30 NOTE — LETTER
[facility]  [address]  [place] [address]  [phone]                                                                                                           Albania Forrest  [address] [place] [address]    May 31, 2019      Dear Albania,    Please see attached lab results   They are all normal     THE FOLLOWING ARE EXPLANATIONS OF SOME OF OUR LAB TESTS     YOU DID NOT NECESSARILY HAVE ALL OF THESE DONE     Hgb is the blood iron level   WBC means White Blood Cells   Platelets are small blood    cells that help with forming the blood clots along with other blood factors.   Electrolytes are Sodium, Potassium, Calcium, Magnesium, Phosphorus.   Liver tests are: AST, ALT, Bilirubin, Alkaline Phosphatase.   Kidney tests are Creatinine, GFR.   HDL Choles   terol - is the good cholesterol and it is good to have it high.   LDL cholesterol is the bad cholesterol and it is good to have it low.   It is recommended to have LDL less than 130 for people with hypertension and to have it less than 100 for people with   heart disease, diabetes and chronic kidney disease.   Triglycerides are another type of lipid that can cause heart disease, like the cholesterol and should be kept low   Thyroid tests are TSH, T4, T3   Glucose is sugar.   A1c is a test that gives us an idea    about how well was controlled the diabetes for the last 3 months.     Please continue on the same medications     Thank you for choosing [facility].  We appreciate the opportunity to serve you and look forward to supporting your healthcare needs in the future.    If you have any questions or concerns, please call me or my staff at [phone].      Sincerely,    Liana Mar MD    Results for orders placed or performed in visit on 05/30/19   GLUCOSE   Result Value Ref Range    Glucose 83 70 - 99 mg/dL   Lipid panel reflex to direct LDL Fasting    Result Value Ref Range    Cholesterol 182 <200 mg/dL    Triglycerides 79 <150 mg/dL    HDL Cholesterol 64 >49 mg/dL    LDL Cholesterol Calculated 102 (H) <100 mg/dL    Non HDL Cholesterol 118 <130 mg/dL   ALT   Result Value Ref Range    ALT 16 0 - 50 U/L

## 2019-05-30 NOTE — PROGRESS NOTES
SUBJECTIVE:   CC: Albania Forrest is an 44 year old woman who presents for preventive health visit.     Healthy Habits:     Getting at least 3 servings of Calcium per day:  NO    Bi-annual eye exam:  Yes    Dental care twice a year:  Yes    Sleep apnea or symptoms of sleep apnea:  None    Diet:  Low fat/cholesterol    Frequency of exercise:  4-5 days/week    Duration of exercise:  30-45 minutes    Taking medications regularly:  Yes    Barriers to taking medications:  None    Medication side effects:  None    PHQ-2 Total Score: 0    Additional concerns today:  No        Today's PHQ-2 Score:   PHQ-2 ( 1999 Pfizer) 5/30/2019   Q1: Little interest or pleasure in doing things 0   Q2: Feeling down, depressed or hopeless 0   PHQ-2 Score 0   Q1: Little interest or pleasure in doing things Not at all   Q2: Feeling down, depressed or hopeless Not at all   PHQ-2 Score 0       Abuse: Current or Past(Physical, Sexual or Emotional)- No  Do you feel safe in your environment? Yes    Social History     Tobacco Use     Smoking status: Never Smoker     Smokeless tobacco: Never Used   Substance Use Topics     Alcohol use: No     Alcohol/week: 0.0 oz     Alcohol Use 5/30/2019   Prescreen: >3 drinks/day or >7 drinks/week? No   Prescreen: >3 drinks/day or >7 drinks/week? -   No flowsheet data found.    Reviewed orders with patient.  Reviewed health maintenance and updated orders accordingly - Yes  Labs reviewed in [facility]    Mammogram Screening: Patient under age 50, mutual decision reflected in health maintenance.      Pertinent mammograms are reviewed under the imaging tab.  History of abnormal Pap smear: NO - age 30-65 PAP every 5 years with negative HPV co-testing recommended  PAP / HPV Latest Ref Rng & Units 6/2/2016   PAP - NIL   HPV 16 DNA NEG Negative   HPV 18 DNA NEG Negative   OTHER HR HPV NEG Negative     Reviewed and updated as needed this visit by clinical staff  Tobacco  Allergies  Meds  Problems  Med Hx  Surg Hx  Fam Hx  " Soc Hx          Reviewed and updated as needed this visit by Provider  Tobacco  Allergies  Meds  Problems  Med Hx  Surg Hx  Fam Hx            Review of Systems  CONSTITUTIONAL: NEGATIVE for fever, chills, change in weight  INTEGUMENTARU/SKIN: NEGATIVE for worrisome rashes, moles or lesions  EYES: NEGATIVE for vision changes or irritation  ENT: NEGATIVE for ear, mouth and throat problems  RESP: NEGATIVE for significant cough or SOB  BREAST: NEGATIVE for masses, tenderness or discharge  CV: NEGATIVE for chest pain, palpitations or peripheral edema  GI: NEGATIVE for nausea, abdominal pain, heartburn, or change in bowel habits  : NEGATIVE for unusual urinary or vaginal symptoms. Periods are regular.  MUSCULOSKELETAL: NEGATIVE for significant arthralgias or myalgia  NEURO: NEGATIVE for weakness, dizziness or paresthesias  PSYCHIATRIC: NEGATIVE for changes in mood or affect     OBJECTIVE:   /60   Pulse 61   Temp 98.7  F (37.1  C) (Tympanic)   Resp 16   Ht 1.575 m (5' 2\")   Wt 49.9 kg (110 lb)   LMP  (LMP Unknown)   SpO2 99%   Breastfeeding? No   BMI 20.12 kg/m    Physical Exam  GENERAL: healthy, alert and no distress  EYES: Eyes grossly normal to inspection, PERRL and conjunctivae and sclerae normal  NECK: no adenopathy, no asymmetry, masses, or scars and thyroid normal to palpation  RESP: lungs clear to auscultation - no rales, rhonchi or wheezes  BREAST: normal without masses, tenderness or nipple discharge and no palpable axillary masses or adenopathy  CV: regular rate and rhythm, normal S1 S2, no S3 or S4, no murmur, click or rub, no peripheral edema and peripheral pulses strong  ABDOMEN: soft, nontender, no hepatosplenomegaly, no masses and bowel sounds normal  MS: no gross musculoskeletal defects noted, no edema  SKIN: no suspicious lesions or rashes  NEURO: Normal strength and tone, mentation intact and speech normal  PSYCH: mentation appears normal, affect normal/bright  LYMPH: no " "cervical, supraclavicular, axillary, or inguinal adenopathy    Diagnostic Test Results:  Labs reviewed in Epic  Results for orders placed or performed in visit on 05/11/18   GLUCOSE   Result Value Ref Range    Glucose 83 70 - 99 mg/dL   HIV Antigen Antibody Combo   Result Value Ref Range    HIV Antigen Antibody Combo Nonreactive NR^Nonreactive       Lipid panel reflex to direct LDL Non-fasting   Result Value Ref Range    Cholesterol 185 <200 mg/dL    Triglycerides 91 <150 mg/dL    HDL Cholesterol 68 >49 mg/dL    LDL Cholesterol Calculated 99 <100 mg/dL    Non HDL Cholesterol 117 <130 mg/dL       ASSESSMENT/PLAN:       ICD-10-CM    1. Routine general medical examination at a health care facility Z00.00    2. Hypercholesterolemia E78.00 Lipid panel reflex to direct LDL Fasting     ALT   3. Family history of ischemic heart disease Z82.49    4. Chronic hepatitis C without hepatic coma (H) B18.2 ALT   5. Screening for diabetes mellitus (DM) Z13.1 GLUCOSE       COUNSELING:  Reviewed preventive health counseling, as reflected in patient instructions       Regular exercise       Healthy diet/nutrition       Vision screening    Estimated body mass index is 20.12 kg/m  as calculated from the following:    Height as of this encounter: 1.575 m (5' 2\").    Weight as of this encounter: 49.9 kg (110 lb).         reports that she has never smoked. She has never used smokeless tobacco.   Patient Instructions     Preventive Health Recommendations  Female Ages 40 to 49    Yearly exam:     See your health care provider every year in order to  1. Review health changes.   2. Discuss preventive care.    3. Review your medicines if your doctor prescribed any.      Get a Pap test every three years (unless you have an abnormal result and your provider advises testing more often).      If you get Pap tests with HPV test, you only need to test every 5 years, unless you have an abnormal result. You do not need a Pap test if your uterus was " removed (hysterectomy) and you have not had cancer.      You should be tested each year for STDs (sexually transmitted diseases), if you're at risk.     Ask your doctor if you should have a mammogram.      Have a colonoscopy (test for colon cancer) if someone in your family has had colon cancer or polyps before age 50.       Have a cholesterol test every 5 years.       Have a diabetes test (fasting glucose) after age 45. If you are at risk for diabetes, you should have this test every 3 years.    Shots: Get a flu shot each year. Get a tetanus shot every 10 years.     Nutrition:     Eat at least 5 servings of fruits and vegetables each day.    Eat whole-grain bread, whole-wheat pasta and brown rice instead of white grains and rice.    Get adequate Calcium and Vitamin D.      Lifestyle    Exercise at least 150 minutes a week (an average of 30 minutes a day, 5 days a week). This will help you control your weight and prevent disease.    Limit alcohol to one drink per day.    No smoking.     Wear sunscreen to prevent skin cancer.    See your dentist every six months for an exam and cleaning.    1. . Schedule your mammo at [facility]  At [address] at [phone]      2. Please do your breast exam every mo, when you  Change the  calendar page or set an alarm on your cell phone Do a  visual check for dimples, inversion or indentation or any different position of the nipple Feel manually  for any 1cm or larger  size mass ie about the size of an almond Be sure to cover the entire area of both breasts : this extends back to the back on either side and from the collar bone to the bottom of the breasts where you can begin to feel ribs.  Men are advised to do this exam also as they now have a higher rate of breast cancer , like women do .   Yours was 6-17          Counseling Resources:  ATP IV Guidelines  Pooled Cohorts Equation Calculator  Breast Cancer Risk Calculator  FRAX Risk Assessment  ICSI Preventive  Guidelines  Dietary Guidelines for Americans, 2010  USDA's MyPlate  ASA Prophylaxis  Lung CA Screening    Liana Mar MD  [facility]

## 2019-05-30 NOTE — PATIENT INSTRUCTIONS
Preventive Health Recommendations  Female Ages 40 to 49    Yearly exam:     See your health care provider every year in order to  1. Review health changes.   2. Discuss preventive care.    3. Review your medicines if your doctor prescribed any.      Get a Pap test every three years (unless you have an abnormal result and your provider advises testing more often).      If you get Pap tests with HPV test, you only need to test every 5 years, unless you have an abnormal result. You do not need a Pap test if your uterus was removed (hysterectomy) and you have not had cancer.      You should be tested each year for STDs (sexually transmitted diseases), if you're at risk.     Ask your doctor if you should have a mammogram.      Have a colonoscopy (test for colon cancer) if someone in your family has had colon cancer or polyps before age 50.       Have a cholesterol test every 5 years.       Have a diabetes test (fasting glucose) after age 45. If you are at risk for diabetes, you should have this test every 3 years.    Shots: Get a flu shot each year. Get a tetanus shot every 10 years.     Nutrition:     Eat at least 5 servings of fruits and vegetables each day.    Eat whole-grain bread, whole-wheat pasta and brown rice instead of white grains and rice.    Get adequate Calcium and Vitamin D.      Lifestyle    Exercise at least 150 minutes a week (an average of 30 minutes a day, 5 days a week). This will help you control your weight and prevent disease.    Limit alcohol to one drink per day.    No smoking.     Wear sunscreen to prevent skin cancer.    See your dentist every six months for an exam and cleaning.    1. . Schedule your mammo at [facility]  At [address] at [phone]      2. Please do your breast exam every mo, when you  Change the  calendar page or set an alarm on your cell phone Do a  visual check for dimples, inversion or indentation or any different position of the nipple Feel manually   for any 1cm or larger  size mass ie about the size of an almond Be sure to cover the entire area of both breasts : this extends back to the back on either side and from the collar bone to the bottom of the breasts where you can begin to feel ribs.  Men are advised to do this exam also as they now have a higher rate of breast cancer , like women do .   Yours was 6-17

## 2019-05-31 LAB
ALT SERPL W P-5'-P-CCNC: 16 U/L (ref 0–50)
CHOLEST SERPL-MCNC: 182 MG/DL
GLUCOSE SERPL-MCNC: 83 MG/DL (ref 70–99)
HDLC SERPL-MCNC: 64 MG/DL
LDLC SERPL CALC-MCNC: 102 MG/DL
NONHDLC SERPL-MCNC: 118 MG/DL
TRIGL SERPL-MCNC: 79 MG/DL

## 2019-06-07 ENCOUNTER — HOSPITAL ENCOUNTER (OUTPATIENT)
Dept: MAMMOGRAPHY | Facility: CLINIC | Age: 44
Discharge: HOME | End: 2019-06-07
Attending: FAMILY MEDICINE | Admitting: FAMILY MEDICINE
Payer: COMMERCIAL

## 2019-06-07 DIAGNOSIS — Z12.31 VISIT FOR SCREENING MAMMOGRAM: ICD-10-CM

## 2019-06-07 PROCEDURE — 77067 SCR MAMMO BI INCL CAD: CPT

## 2019-09-20 ENCOUNTER — ALLIED HEALTH/NURSE VISIT (OUTPATIENT)
Dept: NURSING | Facility: CLINIC | Age: 44
End: 2019-09-20
Payer: COMMERCIAL

## 2019-09-20 DIAGNOSIS — Z23 NEED FOR PROPHYLACTIC VACCINATION AND INOCULATION AGAINST INFLUENZA: Primary | ICD-10-CM

## 2019-09-20 PROCEDURE — 90471 IMMUNIZATION ADMIN: CPT

## 2019-09-20 PROCEDURE — 90686 IIV4 VACC NO PRSV 0.5 ML IM: CPT

## 2020-06-25 ENCOUNTER — HOSPITAL ENCOUNTER (OUTPATIENT)
Dept: MAMMOGRAPHY | Facility: CLINIC | Age: 45
Discharge: HOME | End: 2020-06-25
Attending: FAMILY MEDICINE | Admitting: FAMILY MEDICINE
Payer: COMMERCIAL

## 2020-06-25 DIAGNOSIS — Z12.31 SCREENING MAMMOGRAM FOR HIGH-RISK PATIENT: ICD-10-CM

## 2020-06-25 PROCEDURE — 77067 SCR MAMMO BI INCL CAD: CPT

## 2020-07-23 ENCOUNTER — OFFICE VISIT (OUTPATIENT)
Dept: FAMILY MEDICINE | Facility: CLINIC | Age: 45
End: 2020-07-23
Payer: COMMERCIAL

## 2020-07-23 VITALS
WEIGHT: 107 LBS | HEIGHT: 63 IN | BODY MASS INDEX: 18.96 KG/M2 | OXYGEN SATURATION: 98 % | RESPIRATION RATE: 14 BRPM | SYSTOLIC BLOOD PRESSURE: 108 MMHG | HEART RATE: 58 BPM | DIASTOLIC BLOOD PRESSURE: 64 MMHG | TEMPERATURE: 97.4 F

## 2020-07-23 DIAGNOSIS — Z71.89 BEREAVEMENT COUNSELING: ICD-10-CM

## 2020-07-23 DIAGNOSIS — Z23 NEED FOR VACCINATION: ICD-10-CM

## 2020-07-23 DIAGNOSIS — Z82.49 FAMILY HISTORY OF ISCHEMIC HEART DISEASE: ICD-10-CM

## 2020-07-23 DIAGNOSIS — Z86.39 HISTORY OF HYPOKALEMIA: ICD-10-CM

## 2020-07-23 DIAGNOSIS — Z83.3 FAMILY HISTORY OF DIABETES MELLITUS TYPE II: ICD-10-CM

## 2020-07-23 DIAGNOSIS — E46 PROTEIN-CALORIE MALNUTRITION, UNSPECIFIED SEVERITY (H): ICD-10-CM

## 2020-07-23 DIAGNOSIS — Z00.00 ROUTINE GENERAL MEDICAL EXAMINATION AT A HEALTH CARE FACILITY: Primary | ICD-10-CM

## 2020-07-23 DIAGNOSIS — Z86.2 HISTORY OF ANEMIA: ICD-10-CM

## 2020-07-23 DIAGNOSIS — E78.00 HYPERCHOLESTEROLEMIA: ICD-10-CM

## 2020-07-23 LAB
ALBUMIN SERPL-MCNC: 4 G/DL (ref 3.4–5)
ALP SERPL-CCNC: 38 U/L (ref 40–150)
ALT SERPL W P-5'-P-CCNC: 17 U/L (ref 0–50)
ANION GAP SERPL CALCULATED.3IONS-SCNC: 6 MMOL/L (ref 3–14)
AST SERPL W P-5'-P-CCNC: 13 U/L (ref 0–45)
BASOPHILS # BLD AUTO: 0 10E9/L (ref 0–0.2)
BASOPHILS NFR BLD AUTO: 0.2 %
BILIRUB SERPL-MCNC: 1 MG/DL (ref 0.2–1.3)
BUN SERPL-MCNC: 10 MG/DL (ref 7–30)
CALCIUM SERPL-MCNC: 8.6 MG/DL (ref 8.5–10.1)
CHLORIDE SERPL-SCNC: 107 MMOL/L (ref 94–109)
CHOLEST SERPL-MCNC: 239 MG/DL
CO2 SERPL-SCNC: 26 MMOL/L (ref 20–32)
CREAT SERPL-MCNC: 0.6 MG/DL (ref 0.52–1.04)
DIFFERENTIAL METHOD BLD: NORMAL
EOSINOPHIL # BLD AUTO: 0 10E9/L (ref 0–0.7)
EOSINOPHIL NFR BLD AUTO: 0.7 %
ERYTHROCYTE [DISTWIDTH] IN BLOOD BY AUTOMATED COUNT: 12 % (ref 10–15)
GFR SERPL CREATININE-BSD FRML MDRD: >90 ML/MIN/{1.73_M2}
GLUCOSE SERPL-MCNC: 87 MG/DL (ref 70–99)
HCT VFR BLD AUTO: 41.8 % (ref 35–47)
HDLC SERPL-MCNC: 86 MG/DL
HGB BLD-MCNC: 13.9 G/DL (ref 11.7–15.7)
LDLC SERPL CALC-MCNC: 139 MG/DL
LYMPHOCYTES # BLD AUTO: 1.6 10E9/L (ref 0.8–5.3)
LYMPHOCYTES NFR BLD AUTO: 36.7 %
MCH RBC QN AUTO: 31.1 PG (ref 26.5–33)
MCHC RBC AUTO-ENTMCNC: 33.3 G/DL (ref 31.5–36.5)
MCV RBC AUTO: 94 FL (ref 78–100)
MONOCYTES # BLD AUTO: 0.3 10E9/L (ref 0–1.3)
MONOCYTES NFR BLD AUTO: 7.5 %
NEUTROPHILS # BLD AUTO: 2.4 10E9/L (ref 1.6–8.3)
NEUTROPHILS NFR BLD AUTO: 54.9 %
NONHDLC SERPL-MCNC: 153 MG/DL
PLATELET # BLD AUTO: 250 10E9/L (ref 150–450)
POTASSIUM SERPL-SCNC: 3.5 MMOL/L (ref 3.4–5.3)
PROT SERPL-MCNC: 7.7 G/DL (ref 6.8–8.8)
RBC # BLD AUTO: 4.47 10E12/L (ref 3.8–5.2)
SODIUM SERPL-SCNC: 139 MMOL/L (ref 133–144)
TRIGL SERPL-MCNC: 68 MG/DL
TSH SERPL DL<=0.005 MIU/L-ACNC: 1.32 MU/L (ref 0.4–4)
WBC # BLD AUTO: 4.4 10E9/L (ref 4–11)

## 2020-07-23 PROCEDURE — 80061 LIPID PANEL: CPT | Performed by: INTERNAL MEDICINE

## 2020-07-23 PROCEDURE — 36415 COLL VENOUS BLD VENIPUNCTURE: CPT | Performed by: INTERNAL MEDICINE

## 2020-07-23 PROCEDURE — 84134 ASSAY OF PREALBUMIN: CPT | Performed by: INTERNAL MEDICINE

## 2020-07-23 PROCEDURE — 90472 IMMUNIZATION ADMIN EACH ADD: CPT | Performed by: INTERNAL MEDICINE

## 2020-07-23 PROCEDURE — 82306 VITAMIN D 25 HYDROXY: CPT | Performed by: INTERNAL MEDICINE

## 2020-07-23 PROCEDURE — 80050 GENERAL HEALTH PANEL: CPT | Performed by: INTERNAL MEDICINE

## 2020-07-23 PROCEDURE — 90732 PPSV23 VACC 2 YRS+ SUBQ/IM: CPT | Performed by: INTERNAL MEDICINE

## 2020-07-23 PROCEDURE — 90746 HEPB VACCINE 3 DOSE ADULT IM: CPT | Performed by: INTERNAL MEDICINE

## 2020-07-23 PROCEDURE — 90471 IMMUNIZATION ADMIN: CPT | Performed by: INTERNAL MEDICINE

## 2020-07-23 PROCEDURE — 99396 PREV VISIT EST AGE 40-64: CPT | Mod: 25 | Performed by: INTERNAL MEDICINE

## 2020-07-23 ASSESSMENT — MIFFLIN-ST. JEOR: SCORE: 1100.51

## 2020-07-23 NOTE — NURSING NOTE
Prior to immunization administration, verified patients identity using patient s name and date of birth. Please see Immunization Activity for additional information.     Screening Questionnaire for Adult Immunization    Are you sick today?   No   Do you have allergies to medications, food, a vaccine component or latex?   No   Have you ever had a serious reaction after receiving a vaccination?   No   Do you have a long-term health problem with heart, lung, kidney, or metabolic disease (e.g., diabetes), asthma, a blood disorder, no spleen, complement component deficiency, a cochlear implant, or a spinal fluid leak?  Are you on long-term aspirin therapy?   No   Do you have cancer, leukemia, HIV/AIDS, or any other immune system problem?   No   Do you have a parent, brother, or sister with an immune system problem?   No   In the past 3 months, have you taken medications that affect  your immune system, such as prednisone, other steroids, or anticancer drugs; drugs for the treatment of rheumatoid arthritis, Crohn s disease, or psoriasis; or have you had radiation treatments?   No   Have you had a seizure, or a brain or other nervous system problem?   No   During the past year, have you received a transfusion of blood or blood    products, or been given immune (gamma) globulin or antiviral drug?   No   For women: Are you pregnant or is there a chance you could become       pregnant during the next month?   No   Have you received any vaccinations in the past 4 weeks?   No     Immunization questionnaire answers were all negative.        Per orders of Dr. Murguia, injection of Hep. B and Pneumovax 23 given by Debo Govea. Patient instructed to remain in clinic for 15 minutes afterwards, and to report any adverse reaction to me immediately.       Screening performed by Debo Govea on 7/23/2020 at 8:36 AM.

## 2020-07-23 NOTE — LETTER
July 27, 2020      Albania Forrest  [address]  [place] [address]        Dear ,    We are writing to inform you of your test results.    Your Cholesterol remaining high. Given your age and no cardiac risk factors , recommend dietery management of avoiding high fat foods and red meat .     All your other labs normal, you may see some highlighted which do not have Clinical significance.       Please let me know if you have any questions.     Resulted Orders   Comprehensive metabolic panel   Result Value Ref Range    Sodium 139 133 - 144 mmol/L    Potassium 3.5 3.4 - 5.3 mmol/L    Chloride 107 94 - 109 mmol/L    Carbon Dioxide 26 20 - 32 mmol/L    Anion Gap 6 3 - 14 mmol/L    Glucose 87 70 - 99 mg/dL      Comment:      Fasting specimen    Urea Nitrogen 10 7 - 30 mg/dL    Creatinine 0.60 0.52 - 1.04 mg/dL    GFR Estimate >90 >60 mL/min/[1.73_m2]      Comment:      Non  GFR Calc  Starting 12/18/2018, serum creatinine based estimated GFR (eGFR) will be   calculated using the Chronic Kidney Disease Epidemiology Collaboration   (CKD-EPI) equation.      GFR Estimate If Black >90 >60 mL/min/[1.73_m2]      Comment:       GFR Calc  Starting 12/18/2018, serum creatinine based estimated GFR (eGFR) will be   calculated using the Chronic Kidney Disease Epidemiology Collaboration   (CKD-EPI) equation.      Calcium 8.6 8.5 - 10.1 mg/dL    Bilirubin Total 1.0 0.2 - 1.3 mg/dL    Albumin 4.0 3.4 - 5.0 g/dL    Protein Total 7.7 6.8 - 8.8 g/dL    Alkaline Phosphatase 38 (L) 40 - 150 U/L    ALT 17 0 - 50 U/L    AST 13 0 - 45 U/L   CBC with platelets differential   Result Value Ref Range    WBC 4.4 4.0 - 11.0 10e9/L    RBC Count 4.47 3.8 - 5.2 10e12/L    Hemoglobin 13.9 11.7 - 15.7 g/dL    Hematocrit 41.8 35.0 - 47.0 %    MCV 94 78 - 100 fl    MCH 31.1 26.5 - 33.0 pg    MCHC 33.3 31.5 - 36.5 g/dL    RDW 12.0 10.0 - 15.0 %    Platelet Count 250 150 - 450 10e9/L    % Neutrophils 54.9 %    %  Lymphocytes 36.7 %    % Monocytes 7.5 %    % Eosinophils 0.7 %    % Basophils 0.2 %    Absolute Neutrophil 2.4 1.6 - 8.3 10e9/L    Absolute Lymphocytes 1.6 0.8 - 5.3 10e9/L    Absolute Monocytes 0.3 0.0 - 1.3 10e9/L    Absolute Eosinophils 0.0 0.0 - 0.7 10e9/L    Absolute Basophils 0.0 0.0 - 0.2 10e9/L    Diff Method Automated Method    Lipid panel reflex to direct LDL Fasting   Result Value Ref Range    Cholesterol 239 (H) <200 mg/dL      Comment:      Desirable:       <200 mg/dl    Triglycerides 68 <150 mg/dL      Comment:      Fasting specimen    HDL Cholesterol 86 >49 mg/dL    LDL Cholesterol Calculated 139 (H) <100 mg/dL      Comment:      Above desirable:  100-129 mg/dl  Borderline High:  130-159 mg/dL  High:             160-189 mg/dL  Very high:       >189 mg/dl      Non HDL Cholesterol 153 (H) <130 mg/dL      Comment:      Above Desirable:  130-159 mg/dl  Borderline high:  160-189 mg/dl  High:             190-219 mg/dl  Very high:       >219 mg/dl     Vitamin D Deficiency   Result Value Ref Range    Vitamin D Deficiency screening 45 20 - 75 ug/L      Comment:      Season, race, dietary intake, and treatment affect the concentration of   25-hydroxy-Vitamin D. Values may decrease during winter months and increase   during summer months. Values 20-29 ug/L may indicate Vitamin D insufficiency   and values <20 ug/L may indicate Vitamin D deficiency.  Vitamin D determination is routinely performed by an immunoassay specific for   25 hydroxyvitamin D3.  If an individual is on vitamin D2 (ergocalciferol)   supplementation, please specify 25 OH vitamin D2 and D3 level determination by   LCMSMS test [id number].     TSH with free T4 reflex   Result Value Ref Range    TSH 1.32 0.40 - 4.00 mU/L   Prealbumin   Result Value Ref Range    Prealbumin 27 15 - 45 mg/dL       If you have any questions or concerns, please call the clinic at the number listed above.       Sincerely,        Katelynn Murguia MD

## 2020-07-23 NOTE — PROGRESS NOTES
SUBJECTIVE:   CC: Albania Forrest is an 45 year old woman who presents for preventive health visit.     Healthy Habits:    Do you get at least three servings of calcium containing foods daily (dairy, green leafy vegetables, etc.)? yes    Amount of exercise or daily activities, outside of work: 6-7 day(s) per week    Problems taking medications regularly not applicable    Medication side effects: No    Have you had an eye exam in the past two years? Yes, last month    Do you see a dentist twice per year? no    Do you have sleep apnea, excessive snoring or daytime drowsiness?no        Today's PHQ-2 Score:   PHQ-2 ( 1999 Pfizer) 5/30/2019 5/30/2019   Q1: Little interest or pleasure in doing things 0 -   Q2: Feeling down, depressed or hopeless 0 -   PHQ-2 Score 0 -   Q1: Little interest or pleasure in doing things Not at all Not at all   Q2: Feeling down, depressed or hopeless Not at all Not at all   PHQ-2 Score 0 0       Abuse: Current or Past(Physical, Sexual or Emotional)- No  Do you feel safe in your environment? Yes        Social History     Tobacco Use     Smoking status: Never Smoker     Smokeless tobacco: Never Used   Substance Use Topics     Alcohol use: No     Alcohol/week: 0.0 standard drinks     If you drink alcohol do you typically have >3 drinks per day or >7 drinks per week? No                     Reviewed orders with patient.  Reviewed health maintenance and updated orders accordingly - Yes  Lab work is in process    Recent Mammogram done in 06/2020 was normal, PAP not needed, Hysterectomy done.    Pertinent mammograms are reviewed under the imaging tab.  History of abnormal Pap smear: None  PAP / HPV Latest Ref Rng & Units 6/2/2016   PAP - NIL   HPV 16 DNA NEG Negative   HPV 18 DNA NEG Negative   OTHER HR HPV NEG Negative     Reviewed and updated as needed this visit by clinical staff  Tobacco  Allergies  Meds  Med Hx  Surg Hx  Fam Hx  Soc Hx        Reviewed and updated as needed this visit by  Provider        Past Medical History:   Diagnosis Date     Hepatitis C     Treatment successful, considered cured April 2017      Past Surgical History:   Procedure Laterality Date     ABDOMEN SURGERY  10/2006    liver biopsy - Hep C      HYSTERECTOMY, PAP NO LONGER INDICATED  06/24/10    vaginal-for uterine prolapse  left ovaries, no cervix     OB History   No obstetric history on file.       ROS:  CONSTITUTIONAL: NEGATIVE for fever, chills, change in weight  INTEGUMENTARU/SKIN: NEGATIVE for worrisome rashes, moles or lesions  EYES: NEGATIVE for vision changes or irritation  ENT: NEGATIVE for ear, mouth and throat problems  RESP: NEGATIVE for significant cough or SOB  BREAST: NEGATIVE for masses, tenderness or discharge  CV: NEGATIVE for chest pain, palpitations or peripheral edema  GI: NEGATIVE for nausea, abdominal pain, heartburn, or change in bowel habits  : NEGATIVE for unusual urinary or vaginal symptoms. Periods are regular.  MUSCULOSKELETAL: NEGATIVE for significant arthralgias or myalgia  NEURO: NEGATIVE for weakness, dizziness or paresthesias  PSYCHIATRIC: NEGATIVE for changes in mood or affect    OBJECTIVE:   LMP  (LMP Unknown)   EXAM:  GENERAL: healthy, alert and no distress  EYES: Eyes grossly normal to inspection, PERRL and conjunctivae and sclerae normal  HENT: ear canals and TM's normal, nose and mouth without ulcers or lesions  NECK: no adenopathy, no asymmetry, masses, or scars and thyroid normal to palpation  RESP: lungs clear to auscultation - no rales, rhonchi or wheezes  BREAST: normal without masses, tenderness or nipple discharge and no palpable axillary masses or adenopathy  CV: regular rate and rhythm, normal S1 S2, no S3 or S4, no murmur, click or rub, no peripheral edema and peripheral pulses strong  ABDOMEN: soft, nontender, no hepatosplenomegaly, no masses and bowel sounds normal  MS: no gross musculoskeletal defects noted, no edema  SKIN: no suspicious lesions or rashes  NEURO:  "Normal strength and tone, mentation intact and speech normal  PSYCH: mentation appears normal, affect normal/bright    Diagnostic Test Results:  Labs reviewed in Epic    ASSESSMENT/PLAN:   1. Routine general medical examination at a health care facility  2. Hypercholesterolemia  - Lipid panel reflex to direct LDL Fasting    3. Protein-calorie malnutrition, unspecified severity (H)  - Vitamin D Deficiency    4. Family history of ischemic heart disease  8. Family history of diabetes mellitus type II  6. History of hypokalemia  - Comprehensive metabolic panel    5. History of anemia  - CBC with platelets differential    7. Body mass index (BMI) 19.9 or less, adult  - TSH with free T4 reflex      COUNSELING:   Reviewed preventive health counseling, as reflected in patient instructions       Regular exercise       Healthy diet/nutrition       Immunizations    Vaccinated for: Hepatitis B and Pneumococcal          Estimated body mass index is 20.12 kg/m  as calculated from the following:    Height as of 5/30/19: 1.575 m (5' 2\").    Weight as of 5/30/19: 49.9 kg (110 lb).         reports that she has never smoked. She has never used smokeless tobacco.      Counseling Resources:  ATP IV Guidelines  Pooled Cohorts Equation Calculator  Breast Cancer Risk Calculator  FRAX Risk Assessment  ICSI Preventive Guidelines  Dietary Guidelines for Americans, 2010  USDA's MyPlate  ASA Prophylaxis  Lung CA Screening    Katelynn Murguia MD  [facility]  "

## 2020-07-24 LAB
DEPRECATED CALCIDIOL+CALCIFEROL SERPL-MC: 45 UG/L (ref 20–75)
PREALB SERPL IA-MCNC: 27 MG/DL (ref 15–45)

## 2020-07-25 NOTE — RESULT ENCOUNTER NOTE
Your Cholesterol remaining high. Given your age and no cardiac risk factors , recommend dietery management of avoiding high fat foods and red meat .    All your other labs normal, you may see some highlighted which do not have Clinical significance.    Please let me know if you have any questions.  Katelynn Murguia MD on 5/13/2020 at 8:55 AM

## 2021-06-04 ENCOUNTER — OFFICE VISIT (OUTPATIENT)
Dept: FAMILY MEDICINE | Facility: CLINIC | Age: 46
End: 2021-06-04
Payer: COMMERCIAL

## 2021-06-04 VITALS
BODY MASS INDEX: 19.14 KG/M2 | HEIGHT: 63 IN | SYSTOLIC BLOOD PRESSURE: 102 MMHG | OXYGEN SATURATION: 98 % | TEMPERATURE: 97.3 F | DIASTOLIC BLOOD PRESSURE: 60 MMHG | WEIGHT: 108 LBS | HEART RATE: 66 BPM

## 2021-06-04 DIAGNOSIS — Z00.00 ROUTINE GENERAL MEDICAL EXAMINATION AT A HEALTH CARE FACILITY: Primary | ICD-10-CM

## 2021-06-04 DIAGNOSIS — Z12.31 ENCOUNTER FOR SCREENING MAMMOGRAM FOR BREAST CANCER: ICD-10-CM

## 2021-06-04 DIAGNOSIS — B19.20 HEPATITIS C VIRUS INFECTION WITHOUT HEPATIC COMA, UNSPECIFIED CHRONICITY: ICD-10-CM

## 2021-06-04 DIAGNOSIS — E78.00 HYPERCHOLESTEROLEMIA: ICD-10-CM

## 2021-06-04 DIAGNOSIS — E46 PROTEIN-CALORIE MALNUTRITION, UNSPECIFIED SEVERITY (H): ICD-10-CM

## 2021-06-04 LAB
ALBUMIN SERPL-MCNC: 3.8 G/DL (ref 3.4–5)
ALP SERPL-CCNC: 34 U/L (ref 40–150)
ALT SERPL W P-5'-P-CCNC: 20 U/L (ref 0–50)
ANION GAP SERPL CALCULATED.3IONS-SCNC: 3 MMOL/L (ref 3–14)
AST SERPL W P-5'-P-CCNC: 11 U/L (ref 0–45)
BASOPHILS # BLD AUTO: 0 10E9/L (ref 0–0.2)
BASOPHILS NFR BLD AUTO: 0.2 %
BILIRUB SERPL-MCNC: 0.7 MG/DL (ref 0.2–1.3)
BUN SERPL-MCNC: 11 MG/DL (ref 7–30)
CALCIUM SERPL-MCNC: 8.7 MG/DL (ref 8.5–10.1)
CHLORIDE SERPL-SCNC: 108 MMOL/L (ref 94–109)
CHOLEST SERPL-MCNC: 214 MG/DL
CO2 SERPL-SCNC: 29 MMOL/L (ref 20–32)
CREAT SERPL-MCNC: 0.57 MG/DL (ref 0.52–1.04)
DEPRECATED CALCIDIOL+CALCIFEROL SERPL-MC: 64 UG/L (ref 20–75)
DIFFERENTIAL METHOD BLD: NORMAL
EOSINOPHIL # BLD AUTO: 0 10E9/L (ref 0–0.7)
EOSINOPHIL NFR BLD AUTO: 0.5 %
ERYTHROCYTE [DISTWIDTH] IN BLOOD BY AUTOMATED COUNT: 12.2 % (ref 10–15)
GFR SERPL CREATININE-BSD FRML MDRD: >90 ML/MIN/{1.73_M2}
GLUCOSE SERPL-MCNC: 82 MG/DL (ref 70–99)
HCT VFR BLD AUTO: 37.4 % (ref 35–47)
HDLC SERPL-MCNC: 74 MG/DL
HGB BLD-MCNC: 12.8 G/DL (ref 11.7–15.7)
LDLC SERPL CALC-MCNC: 123 MG/DL
LYMPHOCYTES # BLD AUTO: 2 10E9/L (ref 0.8–5.3)
LYMPHOCYTES NFR BLD AUTO: 34.9 %
MCH RBC QN AUTO: 31.5 PG (ref 26.5–33)
MCHC RBC AUTO-ENTMCNC: 34.2 G/DL (ref 31.5–36.5)
MCV RBC AUTO: 92 FL (ref 78–100)
MONOCYTES # BLD AUTO: 0.4 10E9/L (ref 0–1.3)
MONOCYTES NFR BLD AUTO: 7.3 %
NEUTROPHILS # BLD AUTO: 3.3 10E9/L (ref 1.6–8.3)
NEUTROPHILS NFR BLD AUTO: 57.1 %
NONHDLC SERPL-MCNC: 140 MG/DL
PLATELET # BLD AUTO: 206 10E9/L (ref 150–450)
POTASSIUM SERPL-SCNC: 3.6 MMOL/L (ref 3.4–5.3)
PROT SERPL-MCNC: 7 G/DL (ref 6.8–8.8)
RBC # BLD AUTO: 4.06 10E12/L (ref 3.8–5.2)
SODIUM SERPL-SCNC: 140 MMOL/L (ref 133–144)
TRIGL SERPL-MCNC: 85 MG/DL
TSH SERPL DL<=0.005 MIU/L-ACNC: 1.1 MU/L (ref 0.4–4)
WBC # BLD AUTO: 5.8 10E9/L (ref 4–11)

## 2021-06-04 PROCEDURE — 99396 PREV VISIT EST AGE 40-64: CPT | Performed by: INTERNAL MEDICINE

## 2021-06-04 PROCEDURE — 80061 LIPID PANEL: CPT | Performed by: INTERNAL MEDICINE

## 2021-06-04 PROCEDURE — 80050 GENERAL HEALTH PANEL: CPT | Performed by: INTERNAL MEDICINE

## 2021-06-04 PROCEDURE — 36415 COLL VENOUS BLD VENIPUNCTURE: CPT | Performed by: INTERNAL MEDICINE

## 2021-06-04 PROCEDURE — 82306 VITAMIN D 25 HYDROXY: CPT | Performed by: INTERNAL MEDICINE

## 2021-06-04 RX ORDER — MINOCYCLINE HYDROCHLORIDE 100 MG/1
CAPSULE ORAL
COMMUNITY
Start: 2020-11-17 | End: 2021-06-04

## 2021-06-04 RX ORDER — DESONIDE 0.5 MG/G
OINTMENT TOPICAL
COMMUNITY
Start: 2020-11-09 | End: 2021-06-04

## 2021-06-04 RX ORDER — MULTIPLE VITAMINS W/ MINERALS TAB 9MG-400MCG
1 TAB ORAL DAILY
COMMUNITY

## 2021-06-04 ASSESSMENT — PAIN SCALES - GENERAL: PAINLEVEL: NO PAIN (0)

## 2021-06-04 ASSESSMENT — MIFFLIN-ST. JEOR: SCORE: 1100.04

## 2021-06-04 NOTE — PATIENT INSTRUCTIONS
Please do the lab work as discussed.     ====================================    Preventive Health Recommendations  Female Ages 40 to 49    Yearly exam:     See your health care provider every year in order to  1. Review health changes.   2. Discuss preventive care.    3. Review your medicines if your doctor prescribed any.      Get a Pap test every three years (unless you have an abnormal result and your provider advises testing more often).      If you get Pap tests with HPV test, you only need to test every 5 years, unless you have an abnormal result. You do not need a Pap test if your uterus was removed (hysterectomy) and you have not had cancer.      You should be tested each year for STDs (sexually transmitted diseases), if you're at risk.     Ask your doctor if you should have a mammogram.      Have a colonoscopy (test for colon cancer) if someone in your family has had colon cancer or polyps before age 50.       Have a cholesterol test every 5 years.       Have a diabetes test (fasting glucose) after age 45. If you are at risk for diabetes, you should have this test every 3 years.    Shots: Get a flu shot each year. Get a tetanus shot every 10 years.     Nutrition:     Eat at least 5 servings of fruits and vegetables each day.    Eat whole-grain bread, whole-wheat pasta and brown rice instead of white grains and rice.    Get adequate Calcium and Vitamin D.      Lifestyle    Exercise at least 150 minutes a week (an average of 30 minutes a day, 5 days a week). This will help you control your weight and prevent disease.    Limit alcohol to one drink per day.    No smoking.     Wear sunscreen to prevent skin cancer.    See your dentist every six months for an exam and cleaning.

## 2021-06-04 NOTE — PROGRESS NOTES
SUBJECTIVE:   CC: Albania Forrest is an 46 year old woman who presents for preventive health visit.     Patient has been advised of split billing requirements and indicates understanding: Yes  Healthy Habits:    Do you get at least three servings of calcium containing foods daily (dairy, green leafy vegetables, etc.)? yes    Amount of exercise or daily activities, outside of work:     Problems taking medications regularly No    Medication side effects: No    Have you had an eye exam in the past two years? yes    Do you see a dentist twice per year? yes    Do you have sleep apnea, excessive snoring or daytime drowsiness?no      Today's PHQ-2 Score:   PHQ-2 ( 1999 Pfizer) 6/4/2021 7/23/2020   Q1: Little interest or pleasure in doing things 0 0   Q2: Feeling down, depressed or hopeless 0 0   PHQ-2 Score 0 0   Q1: Little interest or pleasure in doing things - -   Q2: Feeling down, depressed or hopeless - -   PHQ-2 Score - -       Abuse: Current or Past(Physical, Sexual or Emotional)- No  Do you feel safe in your environment? Yes    Have you ever done Advance Care Planning? (For example, a Health Directive, POLST, or a discussion with a medical provider or your loved ones about your wishes): No, advance care planning information given to patient to review.  Patient declined advance care planning discussion at this time.    Social History     Tobacco Use     Smoking status: Never Smoker     Smokeless tobacco: Never Used   Substance Use Topics     Alcohol use: No     Alcohol/week: 0.0 standard drinks     If you drink alcohol do you typically have >3 drinks per day or >7 drinks per week? No                     Reviewed orders with patient.  Reviewed health maintenance and updated orders accordingly - Yes  Lab work is in process  Labs reviewed in EPIC    FSH-7: No flowsheet data found.  click delete button to remove this line now  Mammogram Screening: Recommended annual mammography  Pertinent mammograms are reviewed under the  "imaging tab.    Pertinent mammograms are reviewed under the imaging tab.  History of abnormal Pap smear: Status post benign hysterectomy. Health Maintenance and Surgical History updated.  PAP / HPV Latest Ref Rng & Units 6/2/2016   PAP - NIL   HPV 16 DNA NEG Negative   HPV 18 DNA NEG Negative   OTHER HR HPV NEG Negative     Reviewed and updated as needed this visit by clinical staff  Tobacco  Allergies  Meds  Problems  Med Hx  Surg Hx  Fam Hx          Reviewed and updated as needed this visit by Provider  Tobacco  Allergies  Meds  Problems  Med Hx  Surg Hx  Fam Hx         Past Medical History:   Diagnosis Date     Hepatitis C     Treatment successful, considered cured April 2017      Past Surgical History:   Procedure Laterality Date     ABDOMEN SURGERY  10/2006    liver biopsy - Hep C      HYSTERECTOMY, PAP NO LONGER INDICATED  06/24/10    vaginal-for uterine prolapse  left ovaries, no cervix     OB History   No obstetric history on file.       ROS:  CONSTITUTIONAL: NEGATIVE for fever, chills, change in weight  INTEGUMENTARY/SKIN: NEGATIVE for worrisome rashes, moles or lesions  EYES: NEGATIVE for vision changes or irritation  ENT: NEGATIVE for ear, mouth and throat problems  RESP: NEGATIVE for significant cough or SOB  BREAST: NEGATIVE for masses, tenderness or discharge  CV: NEGATIVE for chest pain, palpitations or peripheral edema  GI: NEGATIVE for nausea, abdominal pain, heartburn, or change in bowel habits  : NEGATIVE for unusual urinary or vaginal symptoms. No vaginal bleeding.  MUSCULOSKELETAL: NEGATIVE for significant arthralgias or myalgia  NEURO: NEGATIVE for weakness, dizziness or paresthesias  PSYCHIATRIC: NEGATIVE for changes in mood or affect     OBJECTIVE:   /60   Pulse 66   Temp 97.3  F (36.3  C) (Tympanic)   Ht 1.594 m (5' 2.75\")   Wt 49 kg (108 lb)   LMP  (LMP Unknown)   SpO2 98%   BMI 19.28 kg/m    EXAM:  GENERAL APPEARANCE: healthy, alert and no distress  EYES: Eyes " grossly normal to inspection, PERRL and conjunctivae and sclerae normal  HENT: ear canals and TM's normal, nose and mouth without ulcers or lesions, oropharynx clear and oral mucous membranes moist  NECK: no adenopathy, no asymmetry, masses, or scars and thyroid normal to palpation  RESP: lungs clear to auscultation - no rales, rhonchi or wheezes  BREAST: normal without masses, tenderness or nipple discharge and no palpable axillary masses or adenopathy  CV: regular rate and rhythm, normal S1 S2, no S3 or S4, no murmur, click or rub, no peripheral edema and peripheral pulses strong  ABDOMEN: soft, nontender, no hepatosplenomegaly, no masses and bowel sounds normal  MS: no musculoskeletal defects are noted and gait is age appropriate without ataxia  SKIN: no suspicious lesions or rashes  NEURO: Normal strength and tone, sensory exam grossly normal, mentation intact and speech normal  PSYCH: mentation appears normal and affect normal/bright    Diagnostic Test Results:  Labs reviewed in Epic    ASSESSMENT/PLAN:     Assessment and Plan  1. Routine general medical examination at a health care facility  Last seen pt on 7/2020 for Annual physical She is here today for physical.   - REVIEW OF HEALTH MAINTENANCE PROTOCOL ORDERS  - MA SCREENING DIGITAL BILAT - Future  (s+30); Future  - CBC with platelets differential  - Comprehensive metabolic panel  - Lipid panel reflex to direct LDL Fasting  - TSH with free T4 reflex  - Vitamin D Deficiency    2. Protein-calorie malnutrition, unspecified severity (H)  - Vitamin D Deficiency    3. Hypercholesterolemia  - Lipid panel reflex to direct LDL Fasting    4. Hepatitis C virus infection without hepatic coma, unspecified chronicity  - CBC with platelets differential  - Comprehensive metabolic panel    5. Encounter for screening mammogram for breast cancer  - MA SCREENING DIGITAL BILAT - Future  (s+30); Future     Patient Instructions   Please do the lab work as discussed.      ====================================    Preventive Health Recommendations  Female Ages 40 to 49    Yearly exam:     See your health care provider every year in order to  1. Review health changes.   2. Discuss preventive care.    3. Review your medicines if your doctor prescribed any.      Get a Pap test every three years (unless you have an abnormal result and your provider advises testing more often).      If you get Pap tests with HPV test, you only need to test every 5 years, unless you have an abnormal result. You do not need a Pap test if your uterus was removed (hysterectomy) and you have not had cancer.      You should be tested each year for STDs (sexually transmitted diseases), if you're at risk.     Ask your doctor if you should have a mammogram.      Have a colonoscopy (test for colon cancer) if someone in your family has had colon cancer or polyps before age 50.       Have a cholesterol test every 5 years.       Have a diabetes test (fasting glucose) after age 45. If you are at risk for diabetes, you should have this test every 3 years.    Shots: Get a flu shot each year. Get a tetanus shot every 10 years.     Nutrition:     Eat at least 5 servings of fruits and vegetables each day.    Eat whole-grain bread, whole-wheat pasta and brown rice instead of white grains and rice.    Get adequate Calcium and Vitamin D.      Lifestyle    Exercise at least 150 minutes a week (an average of 30 minutes a day, 5 days a week). This will help you control your weight and prevent disease.    Limit alcohol to one drink per day.    No smoking.     Wear sunscreen to prevent skin cancer.    See your dentist every six months for an exam and cleaning.    Return in about 1 year (around 6/4/2022), or if symptoms worsen or fail to improve, for Preventative Visit.    Katelynn Murguia MD  [facility] [place]      Patient has been advised of split billing requirements and indicates understanding:  "Yes  COUNSELING:   Reviewed preventive health counseling, as reflected in patient instructions  Special attention given to:        Regular exercise       Healthy diet/nutrition       Vision screening       Hearing screening       MMR vaccine reminder (1 dose) for patients born after 1956 with no documented vaccination/immunity        (Anne)menopause management    Estimated body mass index is 19.28 kg/m  as calculated from the following:    Height as of this encounter: 1.594 m (5' 2.75\").    Weight as of this encounter: 49 kg (108 lb).    Weight management plan noted, stable and monitoring    She reports that she has never smoked. She has never used smokeless tobacco.      Counseling Resources:  ATP IV Guidelines  Pooled Cohorts Equation Calculator  Breast Cancer Risk Calculator  BRCA-Related Cancer Risk Assessment: FHS-7 Tool  FRAX Risk Assessment  ICSI Preventive Guidelines  Dietary Guidelines for Americans, 2010  USDA's MyPlate  ASA Prophylaxis  Lung CA Screening    Katelynn Murguia MD  [facility]  "

## 2021-06-05 NOTE — RESULT ENCOUNTER NOTE
Your Cholesterol remaining high. Given your age and no cardiac risk factors , recommend dietery management of avoiding high fat foods and red meat .    All your other labs normal, you may see some highlighted which do not have Clinical significance.    Please let me know if you have any questions.    Katelynn Murguia MD on 6/5/2021 at 4:45 PM  [facility]

## 2021-06-07 ENCOUNTER — TELEPHONE (OUTPATIENT)
Dept: FAMILY MEDICINE | Facility: CLINIC | Age: 46
End: 2021-06-07

## 2021-06-07 NOTE — TELEPHONE ENCOUNTER
----- Message from Katelynn Murguia MD sent at 6/5/2021  4:46 PM CDT -----  Your Cholesterol remaining high. Given your age and no cardiac risk factors , recommend dietery management of avoiding high fat foods and red meat .    All your other labs normal, you may see some highlighted which do not have Clinical significance.    Please let me know if you have any questions.    Katelynn Murguia MD on 6/5/2021 at 4:45 PM  [facility]

## 2021-06-07 NOTE — TELEPHONE ENCOUNTER
----- Message from Katelynn Murguia MD sent at 6/5/2021  4:46 PM CDT -----  Your Cholesterol remaining high. Given your age and no cardiac risk factors , recommend dietery management of avoiding high fat foods and red meat .    All your other labs normal, you may see some highlighted which do not have Clinical significance.    Please let me know if you have any questions.    Katelynn Murguia MD on 6/5/2021 at 4:45 PM  [facility]

## 2021-07-01 ENCOUNTER — HOSPITAL ENCOUNTER (OUTPATIENT)
Dept: MAMMOGRAPHY | Facility: CLINIC | Age: 46
Discharge: HOME | End: 2021-07-01
Attending: INTERNAL MEDICINE | Admitting: INTERNAL MEDICINE
Payer: COMMERCIAL

## 2021-07-01 DIAGNOSIS — Z12.31 ENCOUNTER FOR SCREENING MAMMOGRAM FOR BREAST CANCER: ICD-10-CM

## 2021-07-01 DIAGNOSIS — Z00.00 ROUTINE GENERAL MEDICAL EXAMINATION AT A HEALTH CARE FACILITY: ICD-10-CM

## 2021-07-01 PROCEDURE — 77067 SCR MAMMO BI INCL CAD: CPT

## 2022-01-13 ENCOUNTER — IMMUNIZATION (OUTPATIENT)
Dept: NURSING | Facility: CLINIC | Age: 47
End: 2022-01-13
Payer: COMMERCIAL

## 2022-01-13 DIAGNOSIS — Z23 NEED FOR COVID-19 VACCINE: Primary | ICD-10-CM

## 2022-01-13 PROCEDURE — 91306 COVID-19,PF,MODERNA (18+ YRS BOOSTER .25ML): CPT

## 2022-01-13 PROCEDURE — 0064A COVID-19,PF,MODERNA (18+ YRS BOOSTER .25ML): CPT

## 2022-06-10 ENCOUNTER — OFFICE VISIT (OUTPATIENT)
Dept: FAMILY MEDICINE | Facility: CLINIC | Age: 47
End: 2022-06-10
Payer: COMMERCIAL

## 2022-06-10 VITALS
HEIGHT: 63 IN | OXYGEN SATURATION: 100 % | SYSTOLIC BLOOD PRESSURE: 102 MMHG | WEIGHT: 105 LBS | RESPIRATION RATE: 16 BRPM | TEMPERATURE: 97.9 F | BODY MASS INDEX: 18.61 KG/M2 | HEART RATE: 61 BPM | DIASTOLIC BLOOD PRESSURE: 68 MMHG

## 2022-06-10 DIAGNOSIS — Z12.11 SCREEN FOR COLON CANCER: ICD-10-CM

## 2022-06-10 DIAGNOSIS — E46 PROTEIN-CALORIE MALNUTRITION, UNSPECIFIED SEVERITY (H): ICD-10-CM

## 2022-06-10 DIAGNOSIS — Z00.00 ROUTINE GENERAL MEDICAL EXAMINATION AT A HEALTH CARE FACILITY: Primary | ICD-10-CM

## 2022-06-10 DIAGNOSIS — Z13.220 SCREENING FOR HYPERLIPIDEMIA: ICD-10-CM

## 2022-06-10 DIAGNOSIS — Z12.31 VISIT FOR SCREENING MAMMOGRAM: ICD-10-CM

## 2022-06-10 LAB
ALBUMIN SERPL-MCNC: 4.1 G/DL (ref 3.4–5)
ALP SERPL-CCNC: 37 U/L (ref 40–150)
ALT SERPL W P-5'-P-CCNC: 14 U/L (ref 0–50)
ANION GAP SERPL CALCULATED.3IONS-SCNC: 4 MMOL/L (ref 3–14)
AST SERPL W P-5'-P-CCNC: 14 U/L (ref 0–45)
BILIRUB SERPL-MCNC: 0.8 MG/DL (ref 0.2–1.3)
BUN SERPL-MCNC: 9 MG/DL (ref 7–30)
CALCIUM SERPL-MCNC: 9.2 MG/DL (ref 8.5–10.1)
CHLORIDE BLD-SCNC: 108 MMOL/L (ref 94–109)
CHOLEST SERPL-MCNC: 220 MG/DL
CO2 SERPL-SCNC: 29 MMOL/L (ref 20–32)
CREAT SERPL-MCNC: 0.63 MG/DL (ref 0.52–1.04)
ERYTHROCYTE [DISTWIDTH] IN BLOOD BY AUTOMATED COUNT: 12.3 % (ref 10–15)
FASTING STATUS PATIENT QL REPORTED: YES
GFR SERPL CREATININE-BSD FRML MDRD: >90 ML/MIN/1.73M2
GLUCOSE BLD-MCNC: 90 MG/DL (ref 70–99)
HCT VFR BLD AUTO: 41 % (ref 35–47)
HDLC SERPL-MCNC: 78 MG/DL
HGB BLD-MCNC: 13.6 G/DL (ref 11.7–15.7)
LDLC SERPL CALC-MCNC: 120 MG/DL
MCH RBC QN AUTO: 31.1 PG (ref 26.5–33)
MCHC RBC AUTO-ENTMCNC: 33.2 G/DL (ref 31.5–36.5)
MCV RBC AUTO: 94 FL (ref 78–100)
NONHDLC SERPL-MCNC: 142 MG/DL
PLATELET # BLD AUTO: 242 10E3/UL (ref 150–450)
POTASSIUM BLD-SCNC: 3.7 MMOL/L (ref 3.4–5.3)
PROT SERPL-MCNC: 7.5 G/DL (ref 6.8–8.8)
RBC # BLD AUTO: 4.37 10E6/UL (ref 3.8–5.2)
SODIUM SERPL-SCNC: 141 MMOL/L (ref 133–144)
TRIGL SERPL-MCNC: 108 MG/DL
WBC # BLD AUTO: 4.5 10E3/UL (ref 4–11)

## 2022-06-10 PROCEDURE — 85027 COMPLETE CBC AUTOMATED: CPT | Performed by: INTERNAL MEDICINE

## 2022-06-10 PROCEDURE — 36415 COLL VENOUS BLD VENIPUNCTURE: CPT | Performed by: INTERNAL MEDICINE

## 2022-06-10 PROCEDURE — 80061 LIPID PANEL: CPT | Performed by: INTERNAL MEDICINE

## 2022-06-10 PROCEDURE — 99396 PREV VISIT EST AGE 40-64: CPT | Performed by: INTERNAL MEDICINE

## 2022-06-10 PROCEDURE — 99213 OFFICE O/P EST LOW 20 MIN: CPT | Mod: 25 | Performed by: INTERNAL MEDICINE

## 2022-06-10 PROCEDURE — 80053 COMPREHEN METABOLIC PANEL: CPT | Performed by: INTERNAL MEDICINE

## 2022-06-10 ASSESSMENT — ENCOUNTER SYMPTOMS
PARESTHESIAS: 0
FEVER: 0
HEARTBURN: 0
CONSTIPATION: 0
COUGH: 0
CHILLS: 0
HEMATURIA: 0
DIZZINESS: 0
ABDOMINAL PAIN: 0
DYSURIA: 0
HEMATOCHEZIA: 0
FREQUENCY: 0
WEAKNESS: 0
NAUSEA: 0
SORE THROAT: 0
HEADACHES: 0
MYALGIAS: 0
PALPITATIONS: 0
NERVOUS/ANXIOUS: 0
EYE PAIN: 0
DIARRHEA: 0
ARTHRALGIAS: 0
JOINT SWELLING: 0
SHORTNESS OF BREATH: 0

## 2022-06-10 ASSESSMENT — PAIN SCALES - GENERAL: PAINLEVEL: NO PAIN (0)

## 2022-06-10 NOTE — PROGRESS NOTES
SUBJECTIVE:   CC: Albania Forrest is an 47 year old woman who presents for preventive health visit.       Patient has been advised of split billing requirements and indicates understanding: Yes  Healthy Habits:     Getting at least 3 servings of Calcium per day:  Yes    Bi-annual eye exam:  Yes    Dental care twice a year:  Yes    Sleep apnea or symptoms of sleep apnea:  None    Diet:  Low fat/cholesterol    Frequency of exercise:  4-5 days/week    Duration of exercise:  30-45 minutes    Taking medications regularly:  Yes    Medication side effects:  None    PHQ-2 Total Score: 0    Additional concerns today:  No      Today's PHQ-2 Score:   PHQ-2 ( 1999 Pfizer) 6/10/2022   Q1: Little interest or pleasure in doing things 0   Q2: Feeling down, depressed or hopeless 0   PHQ-2 Score 0   PHQ-2 Total Score (12-17 Years)- Positive if 3 or more points; Administer PHQ-A if positive -   Q1: Little interest or pleasure in doing things Not at all   Q2: Feeling down, depressed or hopeless Not at all   PHQ-2 Score 0       Abuse: Current or Past (Physical, Sexual or Emotional) - No  Do you feel safe in your environment? Yes        Social History     Tobacco Use     Smoking status: Never Smoker     Smokeless tobacco: Never Used   Substance Use Topics     Alcohol use: No     Alcohol/week: 0.0 standard drinks     If you drink alcohol do you typically have >3 drinks per day or >7 drinks per week? No    Alcohol Use 6/10/2022   Prescreen: >3 drinks/day or >7 drinks/week? No   Prescreen: >3 drinks/day or >7 drinks/week? -   No flowsheet data found.    Reviewed orders with patient.  Reviewed health maintenance and updated orders accordingly - Yes  Lab work is in process  Labs reviewed in EPIC    Breast Cancer Screening:    FHS-7: No flowsheet data found.  click delete button to remove this line now  Mammogram Screening: Recommended annual mammography  Pertinent mammograms are reviewed under the imaging tab.    History of abnormal Pap  smear: NO - age 30- 65 PAP every 3 years recommended  PAP / HPV Latest Ref Rng & Units 6/2/2016   PAP (Historical) - NIL   HPV16 NEG Negative   HPV18 NEG Negative   HRHPV NEG Negative     Reviewed and updated as needed this visit by clinical staff   Tobacco  Allergies  Meds  Problems  Med Hx  Surg Hx  Fam Hx  Soc   Hx          Reviewed and updated as needed this visit by Provider   Tobacco  Allergies  Meds  Problems  Med Hx  Surg Hx  Fam Hx           Past Medical History:   Diagnosis Date     Hepatitis C     Treatment successful, considered cured April 2017      Past Surgical History:   Procedure Laterality Date     ABDOMEN SURGERY  10/2006    liver biopsy - Hep C      HYSTERECTOMY, PAP NO LONGER INDICATED  06/24/10    vaginal-for uterine prolapse  left ovaries, no cervix     OB History   No obstetric history on file.       Review of Systems   Constitutional: Negative for chills and fever.   HENT: Negative for congestion, ear pain, hearing loss and sore throat.    Eyes: Negative for pain and visual disturbance.   Respiratory: Negative for cough and shortness of breath.    Cardiovascular: Negative for chest pain, palpitations and peripheral edema.   Gastrointestinal: Negative for abdominal pain, constipation, diarrhea, heartburn, hematochezia and nausea.   Genitourinary: Negative for dysuria, frequency, genital sores, hematuria and urgency.   Musculoskeletal: Negative for arthralgias, joint swelling and myalgias.   Skin: Negative for rash.   Neurological: Negative for dizziness, weakness, headaches and paresthesias.   Psychiatric/Behavioral: Negative for mood changes. The patient is not nervous/anxious.      CONSTITUTIONAL: NEGATIVE for fever, chills, change in weight  INTEGUMENTARU/SKIN: NEGATIVE for worrisome rashes, moles or lesions  EYES: NEGATIVE for vision changes or irritation  ENT: NEGATIVE for ear, mouth and throat problems  RESP: NEGATIVE for significant cough or SOB  BREAST: NEGATIVE for  "masses, tenderness or discharge  CV: NEGATIVE for chest pain, palpitations or peripheral edema  GI: NEGATIVE for nausea, abdominal pain, heartburn, or change in bowel habits  : NEGATIVE for unusual urinary or vaginal symptoms. Periods are regular.  MUSCULOSKELETAL: NEGATIVE for significant arthralgias or myalgia  NEURO: NEGATIVE for weakness, dizziness or paresthesias  PSYCHIATRIC: NEGATIVE for changes in mood or affect     OBJECTIVE:   /68   Pulse 61   Temp 97.9  F (36.6  C) (Tympanic)   Resp 16   Ht 1.588 m (5' 2.5\")   Wt 47.6 kg (105 lb)   LMP  (LMP Unknown)   SpO2 100%   BMI 18.90 kg/m    Physical Exam  GENERAL: healthy, alert and no distress  EYES: Eyes grossly normal to inspection, PERRL and conjunctivae and sclerae normal  HENT: ear canals and TM's normal, nose and mouth without ulcers or lesions  NECK: no adenopathy, no asymmetry, masses, or scars and thyroid normal to palpation  RESP: lungs clear to auscultation - no rales, rhonchi or wheezes  BREAST: normal without masses, tenderness or nipple discharge and no palpable axillary masses or adenopathy  CV: regular rate and rhythm, normal S1 S2, no S3 or S4, no murmur, click or rub, no peripheral edema and peripheral pulses strong  ABDOMEN: soft, nontender, no hepatosplenomegaly, no masses and bowel sounds normal  MS: no gross musculoskeletal defects noted, no edema  SKIN: no suspicious lesions or rashes  NEURO: Normal strength and tone, mentation intact and speech normal  PSYCH: mentation appears normal, affect normal/bright    Diagnostic Test Results:  Labs reviewed in Epic    ASSESSMENT/PLAN:       Assessment and Plan  1. Routine general medical examination at a health care facility  - Adult Gastro Ref - Procedure Only; Future  - Lipid panel reflex to direct LDL Fasting; Future  - REVIEW OF HEALTH MAINTENANCE PROTOCOL ORDERS  - MA SCREENING DIGITAL BILAT - Future  (s+30); Future  - Comprehensive metabolic panel (BMP + Alb, Alk Phos, ALT, " AST, Total. Bili, TP); Future  - CBC with platelets; Future  - Lipid panel reflex to direct LDL Fasting; Future  - Lipid panel reflex to direct LDL Fasting  - Comprehensive metabolic panel (BMP + Alb, Alk Phos, ALT, AST, Total. Bili, TP)  - CBC with platelets    2. Protein-calorie malnutrition, unspecified severity (H)  - Nutrition Referral; Future    3. Screen for colon cancer  - Adult Gastro Ref - Procedure Only; Future    4. Screening for hyperlipidemia  - Lipid panel reflex to direct LDL Fasting; Future  - Lipid panel reflex to direct LDL Fasting    5. Visit for screening mammogram  - MA SCREENING DIGITAL BILAT - Future  (s+30); Future       Patient Instructions   As discussed , please do fasting lab work ordered.   Placed referral to Nutrition.     ====================    Preventive Health Recommendations  Female Ages 40 to 49    Yearly exam:     See your health care provider every year in order to  1. Review health changes.   2. Discuss preventive care.    3. Review your medicines if your doctor prescribed any.      Get a Pap test every three years (unless you have an abnormal result and your provider advises testing more often).      If you get Pap tests with HPV test, you only need to test every 5 years, unless you have an abnormal result. You do not need a Pap test if your uterus was removed (hysterectomy) and you have not had cancer.      You should be tested each year for STDs (sexually transmitted diseases), if you're at risk.     Ask your doctor if you should have a mammogram.      Have a colonoscopy (test for colon cancer) if someone in your family has had colon cancer or polyps before age 50.       Have a cholesterol test every 5 years.       Have a diabetes test (fasting glucose) after age 45. If you are at risk for diabetes, you should have this test every 3 years.    Shots: Get a flu shot each year. Get a tetanus shot every 10 years.     Nutrition:     Eat at least 5 servings of fruits and  "vegetables each day.    Eat whole-grain bread, whole-wheat pasta and brown rice instead of white grains and rice.    Get adequate Calcium and Vitamin D.      Lifestyle    Exercise at least 150 minutes a week (an average of 30 minutes a day, 5 days a week). This will help you control your weight and prevent disease.    Limit alcohol to one drink per day.    No smoking.     Wear sunscreen to prevent skin cancer.    See your dentist every six months for an exam and cleaning.    Return in about 1 year (around 6/10/2023), or if symptoms worsen or fail to improve, for Preventative Visit.    Katelynn Murguia MD  [facility]      Patient has been advised of split billing requirements and indicates understanding: Yes    COUNSELING:  Reviewed preventive health counseling, as reflected in patient instructions  Special attention given to:        Regular exercise       Healthy diet/nutrition       Vision screening       Hearing screening       Osteoporosis prevention/bone health    Estimated body mass index is 18.9 kg/m  as calculated from the following:    Height as of this encounter: 1.588 m (5' 2.5\").    Weight as of this encounter: 47.6 kg (105 lb).    Weight management plan nutritionist consultation    She reports that she has never smoked. She has never used smokeless tobacco.      Counseling Resources:  ATP IV Guidelines  Pooled Cohorts Equation Calculator  Breast Cancer Risk Calculator  BRCA-Related Cancer Risk Assessment: FHS-7 Tool  FRAX Risk Assessment  ICSI Preventive Guidelines  Dietary Guidelines for Americans, 2010  USDA's MyPlate  ASA Prophylaxis  Lung CA Screening    Katelynn Murguia MD  [facility] [place] [place]  "

## 2022-06-10 NOTE — PATIENT INSTRUCTIONS
As discussed , please do fasting lab work ordered.   Placed referral to Nutrition.     ====================    Preventive Health Recommendations  Female Ages 40 to 49    Yearly exam:   See your health care provider every year in order to  Review health changes.   Discuss preventive care.    Review your medicines if your doctor prescribed any.    Get a Pap test every three years (unless you have an abnormal result and your provider advises testing more often).    If you get Pap tests with HPV test, you only need to test every 5 years, unless you have an abnormal result. You do not need a Pap test if your uterus was removed (hysterectomy) and you have not had cancer.    You should be tested each year for STDs (sexually transmitted diseases), if you're at risk.   Ask your doctor if you should have a mammogram.    Have a colonoscopy (test for colon cancer) if someone in your family has had colon cancer or polyps before age 50.     Have a cholesterol test every 5 years.     Have a diabetes test (fasting glucose) after age 45. If you are at risk for diabetes, you should have this test every 3 years.    Shots: Get a flu shot each year. Get a tetanus shot every 10 years.     Nutrition:   Eat at least 5 servings of fruits and vegetables each day.  Eat whole-grain bread, whole-wheat pasta and brown rice instead of white grains and rice.  Get adequate Calcium and Vitamin D.      Lifestyle  Exercise at least 150 minutes a week (an average of 30 minutes a day, 5 days a week). This will help you control your weight and prevent disease.  Limit alcohol to one drink per day.  No smoking.   Wear sunscreen to prevent skin cancer.  See your dentist every six months for an exam and cleaning.

## 2022-06-12 NOTE — RESULT ENCOUNTER NOTE
Your Cholesterol remaining high. Given your age and no cardiac risk factors , recommend dietery management of avoiding high fat foods and red meat.    All your other labs normal, you may see some highlighted which do not have Clinical significance.    Please let me know if you have any questions.  Katelynn Murguia MD on 6/12/2022   [facility]

## 2022-06-13 ENCOUNTER — TELEPHONE (OUTPATIENT)
Dept: FAMILY MEDICINE | Facility: CLINIC | Age: 47
End: 2022-06-13
Payer: COMMERCIAL

## 2022-06-13 NOTE — TELEPHONE ENCOUNTER
----- Message from Katelynn Murguia MD sent at 6/12/2022  1:14 AM CDT -----  Your Cholesterol remaining high. Given your age and no cardiac risk factors , recommend dietery management of avoiding high fat foods and red meat.    All your other labs normal, you may see some highlighted which do not have Clinical significance.    Please let me know if you have any questions.  Katelynn Murguia MD on 6/12/2022   [facility]

## 2022-06-20 ENCOUNTER — TELEPHONE (OUTPATIENT)
Dept: GASTROENTEROLOGY | Facility: CLINIC | Age: 47
End: 2022-06-20
Payer: COMMERCIAL

## 2022-06-20 NOTE — TELEPHONE ENCOUNTER
Screening Questions  BlueKIND OF PREP RedLOCATION [review exclusion criteria] GreenSEDATION TYPE      1. Are you able to give consent for your medical care? Do you have a legal guardian or medical Power of ?  Y (Sedation review/consideration needed)    2. Have you had a positive covid test in the last 90 days? N  a. If yes, what date?N    3. Are you active on mychart? N    4. What insurance is in the chart? [facility]     3.   Ordering/Referring Provider: OCTAVIO     4. BMI 19.2 [BMI OVER 40-EXTENDED PREP]  If greater than 40 review exclusion criteria [PAC APPT IF @ [facility]]        5.  Respiratory Screening :  [If yes to any of the following HOSPITAL setting only]     Do you use daily home oxygen? N    Do you have mod to severe Obstructive Sleep Apnea? N  [OKAY @ [facility] SH PH RI]   Do you have Pulmonary Hypertension? N     Do you have UNCONTROLLED asthma? N        6.   Have you had a heart or lung transplant? N      7.   Are you currently on dialysis? N [ If yes, G-PREP & HOSPITAL setting only]     8.   Do you have chronic kidney disease? N [ If yes, G-PREP ]    9.   Have you had a stroke or Transient ischemic attack (TIA - aka  mini stroke ) within 6 months?  N (If yes, please review exclusion criteria)    10.   In the past 6 months, have you had any heart related issues including cardiomyopathy or heart attack? N           If yes, did it require cardiac stenting or other implantable device? N      11.   Do you have any implantable devices in your body (pacemaker, defib, LVAD)? N (If yes, please review exclusion criteria)    12.   Do you take nitroglycerin? N           If yes, how often? N  (if yes, HOSPITAL setting ONLY)    13.   Are you currently taking any blood thinners? N           [IF YES, INFORM PATIENT TO FOLLOW UP W/ ORDERING PROVIDER FOR BRIDGING INSTRUCTIONS]     14.   Do you have a diagnosis of diabetes? N   [ If yes, G-PREP ]    15.   [FEMALES] Are you currently pregnant? N    If  yes, how many weeks? N    16.   Are you taking any prescription pain medications on a routine schedule?  N  [ If yes, EXTENDED PREP.] [If yes, MAC]    17.   Do you have any chemical dependencies such as alcohol, street drugs, or methadone?  N [If yes, MAC]    18.   Do you have any history of post-traumatic stress syndrome, severe anxiety or history of psychosis?  N  [If yes, MAC]    19.   Do you transfer independently?  Y    20.  On a regular basis do you go 3-5 days between bowel movements? N   [ If yes, EXTENDED PREP.]    21.   Preferred LOCAL Pharmacy for Pre Prescription   [id number] - [place], [place] - [address]      Scheduling Details      Caller : ANGELINA   (Please ask for phone number if not scheduled by patient)    Type of Procedure Scheduled: COLON  Which Colonoscopy Prep was Sent?: BOB FERRER CF PATIENTS & GROEN'S PATIENTS REQUIRE EXTENDED PREP  Surgeon: CARY  Date of Procedure: 7/21  Location:       Sedation Type: CS  Conscious Sedation- Needs  for 6 hours after the procedure  MAC/General-Needs  for 24 hours after procedure    Pre-op Required at [facility] [place], [place] and OR for MAC sedation: N  (advise patient they will need a pre-op prior to procedure -)      Informed patient they will need an adult  Y  Cannot take any type of public or medical transportation alone    Pre-Procedure Cov[facility] or Externally: Y    Confirmed Nurse will call to complete assessment Y    Additional comments: N

## 2022-07-14 ENCOUNTER — HOSPITAL ENCOUNTER (OUTPATIENT)
Dept: MAMMOGRAPHY | Facility: CLINIC | Age: 47
Discharge: HOME | End: 2022-07-14
Attending: INTERNAL MEDICINE | Admitting: INTERNAL MEDICINE
Payer: COMMERCIAL

## 2022-07-14 DIAGNOSIS — Z12.31 VISIT FOR SCREENING MAMMOGRAM: ICD-10-CM

## 2022-07-14 DIAGNOSIS — Z00.00 ROUTINE GENERAL MEDICAL EXAMINATION AT A HEALTH CARE FACILITY: ICD-10-CM

## 2022-07-14 PROCEDURE — 77067 SCR MAMMO BI INCL CAD: CPT

## 2022-08-11 ENCOUNTER — TELEPHONE (OUTPATIENT)
Dept: GASTROENTEROLOGY | Facility: CLINIC | Age: 47
End: 2022-08-11

## 2022-08-11 NOTE — TELEPHONE ENCOUNTER
Caller: patient called to reschedule due to having another appointment same day.    Procedure: Colon    Date, Location, and Surgeon of Procedure Cancelled: 08/25 Suze Bray    Ordering Provider:Katelynn Murguia MD    Reason for cancel (please be detailed, any staff messages or encounters to note?): Appointment conflict        Rescheduled: Yes     If rescheduled:    Date: 09/15   Location:    Prep Resent: Resent(changes to prep?)   Covid Test Rescheduled: Home   Note any change or update to original order/sedation: No changes

## 2022-09-15 ENCOUNTER — HOSPITAL ENCOUNTER (OUTPATIENT)
Facility: CLINIC | Age: 47
Discharge: HOME | End: 2022-09-15
Attending: COLON & RECTAL SURGERY | Admitting: COLON & RECTAL SURGERY
Payer: COMMERCIAL

## 2022-09-15 VITALS
DIASTOLIC BLOOD PRESSURE: 68 MMHG | HEIGHT: 63 IN | RESPIRATION RATE: 33 BRPM | HEART RATE: 66 BPM | SYSTOLIC BLOOD PRESSURE: 116 MMHG | OXYGEN SATURATION: 100 % | BODY MASS INDEX: 18.07 KG/M2 | WEIGHT: 102 LBS

## 2022-09-15 DIAGNOSIS — Z12.11 SPECIAL SCREENING FOR MALIGNANT NEOPLASMS, COLON: Primary | ICD-10-CM

## 2022-09-15 LAB — COLONOSCOPY: NORMAL

## 2022-09-15 PROCEDURE — G0500 MOD SEDAT ENDO SERVICE >5YRS: HCPCS | Performed by: COLON & RECTAL SURGERY

## 2022-09-15 PROCEDURE — G0121 COLON CA SCRN NOT HI RSK IND: HCPCS | Performed by: COLON & RECTAL SURGERY

## 2022-09-15 PROCEDURE — 45378 DIAGNOSTIC COLONOSCOPY: CPT | Performed by: COLON & RECTAL SURGERY

## 2022-09-15 PROCEDURE — 250N000011 HC RX IP 250 OP 636: Performed by: COLON & RECTAL SURGERY

## 2022-09-15 RX ORDER — LIDOCAINE 40 MG/G
CREAM TOPICAL
Status: DISCONTINUED | OUTPATIENT
Start: 2022-09-15 | End: 2022-09-15 | Stop reason: HOSPADM

## 2022-09-15 RX ORDER — ONDANSETRON 2 MG/ML
4 INJECTION INTRAMUSCULAR; INTRAVENOUS
Status: DISCONTINUED | OUTPATIENT
Start: 2022-09-15 | End: 2022-09-15 | Stop reason: HOSPADM

## 2022-09-15 RX ORDER — FENTANYL CITRATE 50 UG/ML
INJECTION, SOLUTION INTRAMUSCULAR; INTRAVENOUS PRN
Status: COMPLETED | OUTPATIENT
Start: 2022-09-15 | End: 2022-09-15

## 2022-09-15 RX ADMIN — FENTANYL CITRATE 50 MCG: 50 INJECTION, SOLUTION INTRAMUSCULAR; INTRAVENOUS at 12:23

## 2022-09-15 RX ADMIN — MIDAZOLAM 2 MG: 1 INJECTION INTRAMUSCULAR; INTRAVENOUS at 12:17

## 2022-09-15 RX ADMIN — FENTANYL CITRATE 50 MCG: 50 INJECTION, SOLUTION INTRAMUSCULAR; INTRAVENOUS at 12:17

## 2022-09-15 ASSESSMENT — ACTIVITIES OF DAILY LIVING (ADL)
ADLS_ACUITY_SCORE: 33
ADLS_ACUITY_SCORE: 35

## 2022-09-15 NOTE — H&P
Colon & Rectal Surgery History and Physical  Pre-Endoscopy Procedure Note    History of Present Illness   I have been asked by Dr. Murguia to evaluate this 47 year old female for colorectal cancer screening. She currently denies any abdominal pain, weight loss, bleeding per rectum, or recent change in bowel habits.    Past Medical History  Diagnosis Date     Hepatitis C     Treatment successful, considered cured April 2017       Past Surgical History  Procedure Laterality Date     ABDOMINAL SURGERY  10/2006    liver biopsy - Hep C      HYSTERECTOMY, PAP NO LONGER INDICATED  06/24/10    vaginal-for uterine prolapse  left ovaries, no cervix        Medications  Medication Sig     Cholecalciferol (VITAMIN D) 2000 UNITS CAPS Take 1 capsule by mouth daily     multivitamin w/minerals (THERA-VIT-M) tablet Take 1 tablet by mouth daily       Allergies  Allergen Reactions     Pollen Extract      Seasonal Allergies         Family History   Family history includes Cancer in her father; Heart Disease in her father; Unknown/Adopted in her mother.     Social History    She reports that she has never smoked. She has never used smokeless tobacco. She reports that she does not drink alcohol and does not use drugs.    Review of Systems   Constitutional:  No fever, weight change or fatigue.    Eyes:     No dry eyes or vision changes.   Ears/Nose/Throat/Neck:  No oral ulcers, sore throat or voice change.    Cardiovascular:   No palpitations, syncope, angina or edema.   Respiratory:    No chest pain, excessive sleepiness, shortness of breath or hemoptysis.    Gastrointestinal:   No abdominal pain, nausea, vomiting, diarrhea or heartburn.    Genitourinary:   No dysuria, hematuria, urinary retention or urinary frequency.   Musculoskeletal:  No joint swelling or arthralgias.    Dermatologic:  No skin rash or other skin changes.   Neurologic:    No focal weakness or numbness. No neuropathy.   Psychiatric:    No depression, anxiety, suicidal  "ideation, or paranoid ideation.   Endocrine:   No cold or heat intolerance, polydipsia, hirsutism, change in libido, or flushing.   Hematology/Lymphatic:  No bleeding or lymphadenopathy.    Allergy/Immunology:  No rhinitis or hives.     Physical Exam   Vitals:  Blood pressure 104/68, pulse 60, resp. rate 16, height 1.588 m (5' 2.5\"), weight 46.3 kg (102 lb), SpO2 100 %, not currently breastfeeding.    General:  Alert and oriented to person, place and time   Airway: Normal oropharyngeal airway and neck mobility   Lungs:  Clear bilaterally   Heart:  Regular rate and rhythm   Abdomen: Soft, NT, ND, no masses   Extremities: Warm, good capillary refill    ASA Grade: II (mild systemic disease)    Impression: Cleared for use of conscious sedation for colorectal cancer screening    Plan: Proceed with colonoscopy     Sydnie Arciniega MD  [facility]  [phone]  "

## 2022-11-17 ENCOUNTER — ALLIED HEALTH/NURSE VISIT (OUTPATIENT)
Dept: FAMILY MEDICINE | Facility: CLINIC | Age: 47
End: 2022-11-17
Payer: COMMERCIAL

## 2022-11-17 DIAGNOSIS — Z23 NEED FOR PROPHYLACTIC VACCINATION AND INOCULATION AGAINST INFLUENZA: Primary | ICD-10-CM

## 2022-11-17 PROCEDURE — 99207 PR NO CHARGE NURSE ONLY: CPT

## 2022-11-17 PROCEDURE — 90686 IIV4 VACC NO PRSV 0.5 ML IM: CPT

## 2022-11-17 PROCEDURE — 90471 IMMUNIZATION ADMIN: CPT

## 2022-12-30 ENCOUNTER — ALLIED HEALTH/NURSE VISIT (OUTPATIENT)
Dept: FAMILY MEDICINE | Facility: CLINIC | Age: 47
End: 2022-12-30
Payer: COMMERCIAL

## 2022-12-30 DIAGNOSIS — Z23 NEED FOR COVID-19 VACCINE: Primary | ICD-10-CM

## 2022-12-30 PROCEDURE — 91312 COVID-19 VACCINE BIVALENT BOOSTER 12+ (PFIZER): CPT

## 2022-12-30 PROCEDURE — 0124A COVID-19 VACCINE BIVALENT BOOSTER 12+ (PFIZER): CPT

## 2022-12-30 PROCEDURE — 99207 PR NO CHARGE NURSE ONLY: CPT

## 2023-04-28 ENCOUNTER — OFFICE VISIT (OUTPATIENT)
Dept: FAMILY MEDICINE | Facility: CLINIC | Age: 48
End: 2023-04-28
Payer: COMMERCIAL

## 2023-04-28 VITALS
DIASTOLIC BLOOD PRESSURE: 79 MMHG | RESPIRATION RATE: 14 BRPM | OXYGEN SATURATION: 100 % | WEIGHT: 111 LBS | BODY MASS INDEX: 19.67 KG/M2 | HEART RATE: 56 BPM | SYSTOLIC BLOOD PRESSURE: 125 MMHG | TEMPERATURE: 97.9 F | HEIGHT: 63 IN

## 2023-04-28 DIAGNOSIS — E46 PROTEIN-CALORIE MALNUTRITION, UNSPECIFIED SEVERITY (H): ICD-10-CM

## 2023-04-28 DIAGNOSIS — Z23 NEED FOR VACCINATION: ICD-10-CM

## 2023-04-28 DIAGNOSIS — E78.5 DYSLIPIDEMIA: ICD-10-CM

## 2023-04-28 DIAGNOSIS — Z00.00 ROUTINE GENERAL MEDICAL EXAMINATION AT A HEALTH CARE FACILITY: Primary | ICD-10-CM

## 2023-04-28 LAB
ALBUMIN SERPL BCG-MCNC: 4.4 G/DL (ref 3.5–5.2)
ALP SERPL-CCNC: 38 U/L (ref 35–104)
ALT SERPL W P-5'-P-CCNC: 15 U/L (ref 10–35)
ANION GAP SERPL CALCULATED.3IONS-SCNC: 10 MMOL/L (ref 7–15)
AST SERPL W P-5'-P-CCNC: 19 U/L (ref 10–35)
BILIRUB SERPL-MCNC: 0.8 MG/DL
BUN SERPL-MCNC: 10.1 MG/DL (ref 6–20)
CALCIUM SERPL-MCNC: 9.2 MG/DL (ref 8.6–10)
CHLORIDE SERPL-SCNC: 104 MMOL/L (ref 98–107)
CHOLEST SERPL-MCNC: 231 MG/DL
CREAT SERPL-MCNC: 0.63 MG/DL (ref 0.51–0.95)
DEPRECATED HCO3 PLAS-SCNC: 25 MMOL/L (ref 22–29)
ERYTHROCYTE [DISTWIDTH] IN BLOOD BY AUTOMATED COUNT: 12.3 % (ref 10–15)
GFR SERPL CREATININE-BSD FRML MDRD: >90 ML/MIN/1.73M2
GLUCOSE SERPL-MCNC: 93 MG/DL (ref 70–99)
HCT VFR BLD AUTO: 40.5 % (ref 35–47)
HDLC SERPL-MCNC: 70 MG/DL
HGB BLD-MCNC: 13.9 G/DL (ref 11.7–15.7)
LDLC SERPL CALC-MCNC: 143 MG/DL
MCH RBC QN AUTO: 31.3 PG (ref 26.5–33)
MCHC RBC AUTO-ENTMCNC: 34.3 G/DL (ref 31.5–36.5)
MCV RBC AUTO: 91 FL (ref 78–100)
NONHDLC SERPL-MCNC: 161 MG/DL
PLATELET # BLD AUTO: 222 10E3/UL (ref 150–450)
POTASSIUM SERPL-SCNC: 3.8 MMOL/L (ref 3.4–5.3)
PROT SERPL-MCNC: 7.1 G/DL (ref 6.4–8.3)
RBC # BLD AUTO: 4.44 10E6/UL (ref 3.8–5.2)
SODIUM SERPL-SCNC: 139 MMOL/L (ref 136–145)
TRIGL SERPL-MCNC: 90 MG/DL
WBC # BLD AUTO: 6.1 10E3/UL (ref 4–11)

## 2023-04-28 PROCEDURE — 99213 OFFICE O/P EST LOW 20 MIN: CPT | Mod: 25 | Performed by: INTERNAL MEDICINE

## 2023-04-28 PROCEDURE — 36415 COLL VENOUS BLD VENIPUNCTURE: CPT | Performed by: INTERNAL MEDICINE

## 2023-04-28 PROCEDURE — 90714 TD VACC NO PRESV 7 YRS+ IM: CPT | Performed by: INTERNAL MEDICINE

## 2023-04-28 PROCEDURE — 99396 PREV VISIT EST AGE 40-64: CPT | Mod: 25 | Performed by: INTERNAL MEDICINE

## 2023-04-28 PROCEDURE — 80061 LIPID PANEL: CPT | Performed by: INTERNAL MEDICINE

## 2023-04-28 PROCEDURE — 85027 COMPLETE CBC AUTOMATED: CPT | Performed by: INTERNAL MEDICINE

## 2023-04-28 PROCEDURE — 90471 IMMUNIZATION ADMIN: CPT | Performed by: INTERNAL MEDICINE

## 2023-04-28 PROCEDURE — 80053 COMPREHEN METABOLIC PANEL: CPT | Performed by: INTERNAL MEDICINE

## 2023-04-28 RX ORDER — POLYETHYLENE GLYCOL-3350 AND ELECTROLYTES 236; 6.74; 5.86; 2.97; 22.74 G/274.31G; G/274.31G; G/274.31G; G/274.31G; G/274.31G
POWDER, FOR SOLUTION ORAL
COMMUNITY
Start: 2022-08-11 | End: 2024-04-12

## 2023-04-28 ASSESSMENT — ENCOUNTER SYMPTOMS
FEVER: 0
HEMATURIA: 0
EYE PAIN: 0
NAUSEA: 0
PARESTHESIAS: 0
JOINT SWELLING: 0
WEAKNESS: 0
DIARRHEA: 0
ABDOMINAL PAIN: 0
BREAST MASS: 0
ARTHRALGIAS: 0
MYALGIAS: 0
DIZZINESS: 0
PALPITATIONS: 0
HEARTBURN: 0
FREQUENCY: 0
SORE THROAT: 0
SHORTNESS OF BREATH: 0
DYSURIA: 0
HEADACHES: 0
COUGH: 0
CHILLS: 0
NERVOUS/ANXIOUS: 0
HEMATOCHEZIA: 0
CONSTIPATION: 0

## 2023-04-28 ASSESSMENT — PAIN SCALES - GENERAL: PAINLEVEL: NO PAIN (0)

## 2023-04-28 NOTE — PATIENT INSTRUCTIONS
As discussed, please do fasting labs ordered.     ====================================      Preventive Health Recommendations  Female Ages 40 to 49    Yearly exam:     See your health care provider every year in order to  1. Review health changes.   2. Discuss preventive care.    3. Review your medicines if your doctor prescribed any.      Get a Pap test every three years (unless you have an abnormal result and your provider advises testing more often).      If you get Pap tests with HPV test, you only need to test every 5 years, unless you have an abnormal result. You do not need a Pap test if your uterus was removed (hysterectomy) and you have not had cancer.      You should be tested each year for STDs (sexually transmitted diseases), if you're at risk.     Ask your doctor if you should have a mammogram.      Have a colonoscopy (test for colon cancer) if someone in your family has had colon cancer or polyps before age 50.       Have a cholesterol test every 5 years.       Have a diabetes test (fasting glucose) after age 45. If you are at risk for diabetes, you should have this test every 3 years.    Shots: Get a flu shot each year. Get a tetanus shot every 10 years.     Nutrition:     Eat at least 5 servings of fruits and vegetables each day.    Eat whole-grain bread, whole-wheat pasta and brown rice instead of white grains and rice.    Get adequate Calcium and Vitamin D.      Lifestyle    Exercise at least 150 minutes a week (an average of 30 minutes a day, 5 days a week). This will help you control your weight and prevent disease.    Limit alcohol to one drink per day.    No smoking.     Wear sunscreen to prevent skin cancer.    See your dentist every six months for an exam and cleaning.

## 2023-04-28 NOTE — PROGRESS NOTES
SUBJECTIVE:   CC: [place] is an 48 year old who presents for preventive health visit.         4/28/2023     7:05 AM   Additional Questions   Roomed by Aissatou Yates   Patient has been advised of split billing requirements and indicates understanding: Yes  Healthy Habits:     Getting at least 3 servings of Calcium per day:  NO    Bi-annual eye exam:  Yes    Dental care twice a year:  Yes    Sleep apnea or symptoms of sleep apnea:  None    Diet:  Regular (no restrictions)    Frequency of exercise:  1 day/week    Duration of exercise:  15-30 minutes    Taking medications regularly:  Yes    Medication side effects:  None    PHQ-2 Total Score: 0    Additional concerns today:  No      Today's PHQ-2 Score:       4/28/2023     7:00 AM   PHQ-2 ( 1999 Pf[employer])   Q1: Little interest or pleasure in doing things 0   Q2: Feeling down, depressed or hopeless 0   PHQ-2 Score 0   Q1: Little interest or pleasure in doing things Not at all    Not at all   Q2: Feeling down, depressed or hopeless Not at all    Not at all   PHQ-2 Score 0    0           Social History     Tobacco Use     Smoking status: Never     Smokeless tobacco: Never   Vaping Use     Vaping status: Never Used   Substance Use Topics     Alcohol use: No     Alcohol/week: 0.0 standard drinks of alcohol             4/28/2023     7:00 AM   Alcohol Use   Prescreen: >3 drinks/day or >7 drinks/week? Not Applicable          View : No data to display.              Reviewed orders with patient.  Reviewed health maintenance and updated orders accordingly - Yes  Lab work is in process  Labs reviewed in EPIC    Breast Cancer Screening:    FHS-7:       7/14/2022     8:54 AM   Breast CA Risk Assessment (FHS-7)   Did any of your first-degree relatives have breast or ovarian cancer? No   Did any of your relatives have bilateral breast cancer? No   Did any man in your family have breast cancer? No   Did any woman in your family have breast and ovarian cancer? No   Did any woman in  your family have breast cancer before age 50 y? No   Do you have 2 or more relatives with breast and/or ovarian cancer? No   Do you have 2 or more relatives with breast and/or bowel cancer? No     click delete button to remove this line now  Mammogram Screening: Recommended annual mammography  Pertinent mammograms are reviewed under the imaging tab.    History of abnormal Pap smear: NO - age 30-65 PAP every 5 years with negative HPV co-testing recommended      Latest Ref Rng & Units 6/2/2016     9:53 AM 6/2/2016    12:00 AM   PAP / HPV   PAP (Historical)   NIL     HPV 16 DNA NEG Negative      HPV 18 DNA NEG Negative      Other HR HPV NEG Negative        Reviewed and updated as needed this visit by clinical staff   Tobacco  Allergies  Meds  Problems  Med Hx  Surg Hx  Fam Hx          Reviewed and updated as needed this visit by Provider   Tobacco  Allergies  Meds  Problems  Med Hx  Surg Hx  Fam Hx         Past Medical History:   Diagnosis Date     Hepatitis C     Treatment successful, considered cured April 2017      Past Surgical History:   Procedure Laterality Date     ABDOMEN SURGERY  10/2006    liver biopsy - Hep C      COLONOSCOPY N/A 9/15/2022    Procedure: COLONOSCOPY;  Surgeon: Sydnie Arciniega MD;  Location:  GI     HYSTERECTOMY, PAP NO LONGER INDICATED  06/24/10    vaginal-for uterine prolapse  left ovaries, no cervix     OB History   No obstetric history on file.       Review of Systems   Constitutional: Negative for chills and fever.   HENT: Negative for congestion, ear pain, hearing loss and sore throat.    Eyes: Negative for pain and visual disturbance.   Respiratory: Negative for cough and shortness of breath.    Cardiovascular: Negative for chest pain, palpitations and peripheral edema.   Gastrointestinal: Negative for abdominal pain, constipation, diarrhea, heartburn, hematochezia and nausea.   Breasts:  Negative for tenderness, breast mass and discharge.   Genitourinary: Negative  "for dysuria, frequency, genital sores, hematuria, pelvic pain, urgency, vaginal bleeding and vaginal discharge.   Musculoskeletal: Negative for arthralgias, joint swelling and myalgias.   Skin: Negative for rash.   Neurological: Negative for dizziness, weakness, headaches and paresthesias.   Psychiatric/Behavioral: Negative for mood changes. The patient is not nervous/anxious.      CONSTITUTIONAL: NEGATIVE for fever, chills, change in weight  INTEGUMENTARY/SKIN: NEGATIVE for worrisome rashes, moles or lesions  EYES: NEGATIVE for vision changes or irritation  ENT: NEGATIVE for ear, mouth and throat problems  RESP: NEGATIVE for significant cough or SOB  BREAST: NEGATIVE for masses, tenderness or discharge  CV: NEGATIVE for chest pain, palpitations or peripheral edema  GI: NEGATIVE for nausea, abdominal pain, heartburn, or change in bowel habits  : NEGATIVE for unusual urinary or vaginal symptoms. No vaginal bleeding.  MUSCULOSKELETAL: NEGATIVE for significant arthralgias or myalgia  NEURO: NEGATIVE for weakness, dizziness or paresthesias  PSYCHIATRIC: NEGATIVE for changes in mood or affect      OBJECTIVE:   /79   Pulse 56   Temp 97.9  F (36.6  C) (Temporal)   Resp 14   Ht 1.588 m (5' 2.5\")   Wt 50.3 kg (111 lb)   LMP  (LMP Unknown)   SpO2 100%   BMI 19.98 kg/m    Physical Exam  GENERAL APPEARANCE: healthy, alert and no distress  EYES: Eyes grossly normal to inspection, PERRL and conjunctivae and sclerae normal  HENT: ear canals and TM's normal, nose and mouth without ulcers or lesions, oropharynx clear and oral mucous membranes moist  NECK: no adenopathy, no asymmetry, masses, or scars and thyroid normal to palpation  RESP: lungs clear to auscultation - no rales, rhonchi or wheezes  BREAST: normal without masses, tenderness or nipple discharge and no palpable axillary masses or adenopathy  CV: regular rate and rhythm, normal S1 S2, no S3 or S4, no murmur, click or rub, no peripheral edema and " peripheral pulses strong  ABDOMEN: soft, nontender, no hepatosplenomegaly, no masses and bowel sounds normal  MS: no musculoskeletal defects are noted and gait is age appropriate without ataxia  SKIN: no suspicious lesions or rashes  NEURO: Normal strength and tone, sensory exam grossly normal, mentation intact and speech normal  PSYCH: mentation appears normal and affect normal/bright    Diagnostic Test Results:  Labs reviewed in Epic    ASSESSMENT/PLAN:       Assessment and Plan  1. Routine general medical examination at a health care facility  Last seen pt on 6/2022 for Annual physical. She is here for the same. Last labs at that time positive for Dyslipidemia.    - Lipid panel reflex to direct LDL Fasting; Future  - Comprehensive metabolic panel (BMP + Alb, Alk Phos, ALT, AST, Total. Bili, TP); Future  - CBC with platelets; Future  - TD PRESERV FREE, IM (7+ YRS) (DECAVAC/TENIVAC)  - Lipid panel reflex to direct LDL Fasting  - Comprehensive metabolic panel (BMP + Alb, Alk Phos, ALT, AST, Total. Bili, TP)  - CBC with platelets    2. Protein-calorie malnutrition, unspecified severity (H)  Improving, Gained to 111 lbs from past 105 lbs. She wwas given Nutrition referral at that time for her PCM which she didn't follow but try to do on her own.   - Comprehensive metabolic panel (BMP + Alb, Alk Phos, ALT, AST, Total. Bili, TP); Future  - CBC with platelets; Future  - Comprehensive metabolic panel (BMP + Alb, Alk Phos, ALT, AST, Total. Bili, TP)  - CBC with platelets    3. Dyslipidemia  Ongoing problem, Uncontrolled on diet modifications. Started on Statin for improvement.  - Lipid panel reflex to direct LDL Fasting; Future  - Lipid panel reflex to direct LDL Fasting  - simvastatin (ZOCOR) 10 MG tablet; Take 1 tablet (10 mg) by mouth At Bedtime  Dispense: 90 tablet; Refill: 1    4. Need for vaccination  - TD PRESERV FREE, IM (7+ YRS) (DECAVAC/TENIVAC)       Patient Instructions   As discussed, please do fasting labs  ordered.     ====================================      Preventive Health Recommendations  Female Ages 40 to 49    Yearly exam:     See your health care provider every year in order to  1. Review health changes.   2. Discuss preventive care.    3. Review your medicines if your doctor prescribed any.      Get a Pap test every three years (unless you have an abnormal result and your provider advises testing more often).      If you get Pap tests with HPV test, you only need to test every 5 years, unless you have an abnormal result. You do not need a Pap test if your uterus was removed (hysterectomy) and you have not had cancer.      You should be tested each year for STDs (sexually transmitted diseases), if you're at risk.     Ask your doctor if you should have a mammogram.      Have a colonoscopy (test for colon cancer) if someone in your family has had colon cancer or polyps before age 50.       Have a cholesterol test every 5 years.       Have a diabetes test (fasting glucose) after age 45. If you are at risk for diabetes, you should have this test every 3 years.    Shots: Get a flu shot each year. Get a tetanus shot every 10 years.     Nutrition:     Eat at least 5 servings of fruits and vegetables each day.    Eat whole-grain bread, whole-wheat pasta and brown rice instead of white grains and rice.    Get adequate Calcium and Vitamin D.      Lifestyle    Exercise at least 150 minutes a week (an average of 30 minutes a day, 5 days a week). This will help you control your weight and prevent disease.    Limit alcohol to one drink per day.    No smoking.     Wear sunscreen to prevent skin cancer.    See your dentist every six months for an exam and cleaning.    Return in about 1 year (around 4/28/2024), or if symptoms worsen or fail to improve, for Preventative Visit.    Katelynn Murguia MD  [facility]      Patient has been advised of split billing requirements and indicates understanding:  Yes      COUNSELING:  Reviewed preventive health counseling, as reflected in patient instructions  Special attention given to:        Regular exercise       Healthy diet/nutrition       Vision screening       Hearing screening       Immunizations    Vaccinated for: Td             Osteoporosis prevention/bone health        She reports that she has never smoked. She has never used smokeless tobacco.      Katelynn Murguia MD  [facility]

## 2023-04-30 RX ORDER — SIMVASTATIN 10 MG
10 TABLET ORAL AT BEDTIME
Qty: 90 TABLET | Refills: 1 | Status: SHIPPED | OUTPATIENT
Start: 2023-04-30 | End: 2024-04-12

## 2023-09-15 ENCOUNTER — HOSPITAL ENCOUNTER (OUTPATIENT)
Dept: MAMMOGRAPHY | Facility: CLINIC | Age: 48
Discharge: HOME | End: 2023-09-15
Attending: INTERNAL MEDICINE | Admitting: INTERNAL MEDICINE
Payer: COMMERCIAL

## 2023-09-15 DIAGNOSIS — Z12.31 VISIT FOR SCREENING MAMMOGRAM: ICD-10-CM

## 2023-09-15 PROCEDURE — 77067 SCR MAMMO BI INCL CAD: CPT

## 2024-04-05 SDOH — HEALTH STABILITY: PHYSICAL HEALTH: ON AVERAGE, HOW MANY DAYS PER WEEK DO YOU ENGAGE IN MODERATE TO STRENUOUS EXERCISE (LIKE A BRISK WALK)?: 3 DAYS

## 2024-04-05 SDOH — HEALTH STABILITY: PHYSICAL HEALTH: ON AVERAGE, HOW MANY MINUTES DO YOU ENGAGE IN EXERCISE AT THIS LEVEL?: 70 MIN

## 2024-04-05 ASSESSMENT — SOCIAL DETERMINANTS OF HEALTH (SDOH): HOW OFTEN DO YOU GET TOGETHER WITH FRIENDS OR RELATIVES?: ONCE A WEEK

## 2024-04-05 NOTE — COMMUNITY RESOURCES LIST (PATIENT PREFERRED LANGUAGE)
April 5, 2024           ????????????           ??????    ????      Free - Client Services  [address] [place] [address] (??: 18.9 ??)  ??: [phone]  ??: [email or web address]  ??: ??  ??: ???  ????: ????      [facility] - Housing Stabilization Services  ??: [phone]  ??: [email or web address]  ??: ??  ??: ?? ?? 9:00 - ?? 5:00 ?? ?? 9:00 - ?? 5:00 ?? ?? 9:00 - ?? 5:00 ?? ?? 9:00 - ?? 5:00 ??? ?? 9:00 - ?? 5:00  ??: ??  ?????: ?????????????      [employer] - YOUTH SHE[employer]TER  ??: [phone]  ??: [email or web address]  ??: ??    ??????      Living - Housing Stabilization Services  [address] [place] [address] (??: 14.7 ??)  ??: [phone]  ??: [email or web address]  ??: ????????????  ??: ?????      Today [place] - Housing Stabilization Services (HSS)  ??: [phone]  ??: [email or web address] [email or web address] [email or web address]/resources  ??: ??????  ??: ?? ?? 8:00 - ?? 4:00 ?? ?? 8:00 - ?? 4:00 ?? ?? 8:00 - ?? 4:00 ?? ?? 8:00 - ?? 4:00 ??? ?? 8:00 - ?? 4:00  ??: ?????  ?????: ??????????????????????????      [employer] - Housing Stabilization Services  ??: [phone]  ??: [email or web address]  ??: ??  ??: ?? ?? 8:00 - ?? 4:00 ?? ?? 8:00 - ?? 4:00 ?? ?? 8:00 - ?? 4:00 ?? ?? 8:00 - ?? 4:00 ??? ?? 8:00 - ?? 4:00  ??: ???  ?????: ?????????????  ????: ????    ?????????      [facility] - [place] [address] (??: 6.1 ??)  ??: [email or web address]  ??: ??  ??: ?????      [facility] [place] - Warming or cooling [place] [facility] - [address] [place] [address] (??: 5.0 ??)  ??: [phone]  ??: ??, ????, ???, ??, ????, ???  ??: ???      LOVE - LAUNDRY LOVE  ??: http://[email or web address]               ???????        ????  911  .   ????  211 [email or web address]  .   ????  [phone] http://[email or web address]  [email or web address]  .     ????????  988 http://[address] [place], [place] [address]line.org  .   [employer] ????????  [phone] http://ChildMary Rutan Hospitalphotline.org   .   ???????  ?358?120-6665????http://[email or web address]   .     ????????  [phone] (??) http://[facility]-Le[facility] General [email or web address].org  .   ???????  ??/???? [phone] [place]?http://[employer].org [place]?http://[email or web address]/wi  .   ?????????? ([facility])  800-622-HELP (5022) http://Findtreatment.gov   .                ???????????? Unite Us ??????Unite Us ????????????????????Unite Us ????????????????????????????????    [facility]

## 2024-04-05 NOTE — COMMUNITY RESOURCES LIST (ENGLISH)
April 5, 2024           YOUR PERSONALIZED LIST OF SERVICES & PROGRAMS           & SHELTER    Housing      Free - Client Services  [address] [place] [address] (Distance: 18.9 miles)  Phone: [phone]  Website: https://ProteoSense/  Language: English  Fee: Free  Transportation Options: Free transportation      Health Link - Housing Stabilization Services  Phone: [phone] [phone]  Website: https://Jike Xueyuan/Housing-Stabilization.html  Language: English  Hours: Mon 9:00 AM - 5:00 PM Tue 9:00 AM - 5:00 PM Wed 9:00 AM - 5:00 PM Thu 9:00 AM - 5:00 PM Fri 9:00 AM - 5:00 PM  Fee: Insurance  Accessibility: Deaf or hard of hearing, Translation services      [facility] intermediate  Phone: [phone]  Website: https://www.TravelTriangle.org/  Language: English    Case Management      Living - Housing Stabilization Services  [address] [place] [address] (Distance: 14.7 miles)  Phone: [phone]  Website: https://TapEngage  Language: Polish, English, Kenyan  Fee: Insurance, Self pay      Today [place] - Housing Stabilization Services (HSS)  Phone: [phone] [phone]  Website: https://www.MediaMath.Pug Pharm/resources  Language: English, Amharic  Hours: Mon 8:00 AM - 4:00 PM Tue 8:00 AM - 4:00 PM Wed 8:00 AM - 4:00 PM Thu 8:00 AM - 4:00 PM Fri 8:00 AM - 4:00 PM  Fee: Free, Insurance  Accessibility: Ada accessible, Blind accommodation, Deaf or hard of hearing, Translation services      [employer] - Housing Stabilization Services  Phone: [phone]  Website: https://homebasemn.com/  Language: English  Hours: Mon 8:00 AM - 4:00 PM Tue 8:00 AM - 4:00 PM Wed 8:00 AM - 4:00 PM Thu 8:00 AM - 4:00 PM Fri 8:00 AM - 4:00 PM  Fee: Free  Accessibility: Blind accommodation, Deaf or hard of hearing  Transportation Options: Free transportation    Drop-In Services      [place] [address] (Distance:  6.1 miles)  Website: https://www.onThe Frankfurt Group & Holdings.org/housing  Language: English  Fee: Free, Self pay      [facility] Library - Warming or cooling center - [facility] - Chloe Ivy Library  [address] [address] (Distance: 5.0 miles)  Phone: [phone]  Language: English, Kenyan, Amber, Sierra Leonean, Brazilian, Malian  Fee: Free      LOVE - LAUNDRY LOVE  Website: http://www.laundrylove.org               IMPORTANT NUMBERS & WEBSITES        Emergency Services  911  .   United Way  211 http://211unitedway.org  .   Poison Control  (572) 179-4504 http://mnpoison.org http://wisconsinpoison.org  .     Suicide and Crisis Lifeline  988 http://988lifeline.org  .   Childhelp [place] Child Abuse Hotline  623.266.4317 http://Childhelphotline.org   .   [place] Sexual Assault Hotline  (935) 566-1702 (HOPE) http://Googlen.org   .     National Runaway Safeline  (837) 787-2587 (RUNAWAY) http://Qnary.Control Medical Technology  .   Pregnancy & Postpartum Support  Call/text 449-437-5382  [place]: http://ppsupportmn.org  [place]: http://Perpetuuiti TechnoSoft Services.com/wi  .   Substance Abuse National Helpline ([facility])  049-403-HELP (3068) http://Findtreatment.gov   .                DISCLAIMER: These resources have been generated via the What's in My Handbag Platform. What's in My Handbag does not endorse any service providers mentioned in this resource list. What's in My Handbag does not guarantee that the services mentioned in this resource list will be available to you or will improve your health or wellness.    [facility]

## 2024-04-09 NOTE — COMMUNITY RESOURCES LIST (ENGLISH)
April 9, 2024           YOUR PERSONALIZED LIST OF SERVICES & PROGRAMS           & SHELTER    Housing      Free - Client Services  [address] [place] [address] (Distance: 18.9 miles)  Phone: [phone]  Website: https://Orsus Solutions/  Language: English  Fee: Free  Transportation Options: Free transportation      Health Link - Housing Stabilization Services  Phone: [phone] 429-1966  Website: https://Careport Health/Housing-Stabilization.html  Language: English  Hours: Mon 9:00 AM - 5:00 PM Tue 9:00 AM - 5:00 PM Wed 9:00 AM - 5:00 PM Thu 9:00 AM - 5:00 PM Fri 9:00 AM - 5:00 PM  Fee: Insurance  Accessibility: Deaf or hard of hearing, Translation services      [facility] residential  Phone: [phone]  Website: https://www.Perfectore.org/  Language: English    Case Management      Living - Housing Stabilization Services  [address] [place] [address] (Distance: 14.7 miles)  Phone: [phone]  Website: https://Patient Conversation Media  Language: Kinyarwanda, English, Kyrgyz  Fee: Insurance, Self pay      Today [place] - Housing Stabilization Services (HSS)  Phone: [phone]  Website: https://www.Musicmetric.xTurion/resources  Language: English, Amharic  Hours: Mon 8:00 AM - 4:00 PM Tue 8:00 AM - 4:00 PM Wed 8:00 AM - 4:00 PM Thu 8:00 AM - 4:00 PM Fri 8:00 AM - 4:00 PM  Fee: Free, Insurance  Accessibility: Ada accessible, Blind accommodation, Deaf or hard of hearing, Translation services      [employer] - Housing Stabilization Services  Phone: [phone]  Website: https://homebasemn.com/  Language: English  Hours: Mon 8:00 AM - 4:00 PM Tue 8:00 AM - 4:00 PM Wed 8:00 AM - 4:00 PM Thu 8:00 AM - 4:00 PM Fri 8:00 AM - 4:00 PM  Fee: Free  Accessibility: Blind accommodation, Deaf or hard of hearing  Transportation Options: Free transportation    Drop-In Services      [place] [address] (Distance:  6.1 miles)  Website: https://www.onWalkabout.org/housing  Language: English  Fee: Free, Self pay      [facility] Library - Warming or cooling center - [facility] - Chloe Ivy Library  [address] [place] [address] (Distance: 5.0 miles)  Phone: [phone]  Language: English, Ethiopian, Amber, Romanian, Latvian, Macanese  Fee: Free      LOVE - LAUNDRY LOVE  Website: http://www.laundrylove.org               IMPORTANT NUMBERS & WEBSITES        Emergency Services  911  .   United Way  211 http://211unitedway.org  .   Poison Control  (385) 645-6951 http://mnpoison.org http://wisconsinpoison.org  .     Suicide and Crisis Lifeline  988 http://988lifeline.org  .   Childhelp [place] Child Abuse Hotline  373.135.9496 http://Childhelphotline.org   .   [place] Sexual Assault Hotline  (224) 976-8880 (HOPE) http://DVS Intelestreamn.org   .     National Runaway Safeline  (324) 809-2194 (RUNAWAY) http://Outplay Entertainment.EternoGen  .   Pregnancy & Postpartum Support  Call/text 567-540-4602  [place]: http://ppsupportmn.org  [place]: http://Sprinkle.com/wi  .   Substance Abuse National Helpline ([facility])  233-491-HELP (1873) http://Findtreatment.gov   .                DISCLAIMER: These resources have been generated via the Truviso Platform. Truviso does not endorse any service providers mentioned in this resource list. Truviso does not guarantee that the services mentioned in this resource list will be available to you or will improve your health or wellness.    [facility]

## 2024-04-09 NOTE — COMMUNITY RESOURCES LIST (PATIENT PREFERRED LANGUAGE)
April 9, 2024           ????????????           ??????    ????      Free - Client Services  [address] [place] [address] (??: 18.9 ??)  ??: [phone]  ??: https://Oz Sonotek.StyleTrek/  ??: ??  ??: ???  ????: ????      Health Link - Housing Stabilization Services  ??: [phone]  ??: https://HoverWind/Housing-Stabilization.html  ??: ??  ??: ?? ?? 9:00 - ?? 5:00 ?? ?? 9:00 - ?? 5:00 ?? ?? 9:00 - ?? 5:00 ?? ?? 9:00 - ?? 5:00 ??? ?? 9:00 - ?? 5:00  ??: ??  ?????: ?????????????      [facility] SHELTER  ??: [phone]  ??: https://www.safehavenofracine.org/  ??: ??    ??????      Living - Housing Stabilization Services  [address] [place] [address] (??: 14.7 ??)  ??: [phone]  ??: https://www.Phunware  ??: ????????????  ??: ?????      Today [place] - Housing Stabilization Services (HSS)  ??: [phone]  ??: https://www.Warwick Audio TechnologiestodayReunion Rehabilitation Hospital Phoenixpin.net/resources  ??: ??????  ??: ?? ?? 8:00 - ?? 4:00 ?? ?? 8:00 - ?? 4:00 ?? ?? 8:00 - ?? 4:00 ?? ?? 8:00 - ?? 4:00 ??? ?? 8:00 - ?? 4:00  ??: ?????  ?????: ??????????????????????????      [facility] [facility] - Housing Stabilization Services  ??: [phone]  ??: https://Splitcast Technology/  ??: ??  ??: ?? ?? 8:00 - ?? 4:00 ?? ?? 8:00 - ?? 4:00 ?? ?? 8:00 - ?? 4:00 ?? ?? 8:00 - ?? 4:00 ??? ?? 8:00 - ?? 4:00  ??: ???  ?????: ?????????????  ????: ????    ?????????      Chloe [place] - [facility] [place] [address] (??: 6.1 ??)  ??: https://www.[facility].org/housing  ??: ??  ??: ?????      [facility] - [place] - Warming or cooling [place] - [facility] - [address] [place] [address] (??: 5.0 ??)  ??: [phone]  ??: ??, ????, ???, ??, ????, ???  ??: ???      LOVE - LAUNDRY LOVE  ??: http://www.DigiZmart.org               ???????        ????  911  .   ????  211 http://211unSOMNIUMÂ® Technologies.org  .   ????  (161) 175-3306 http://mnpoison.org  http://wisconsinpoison.org  .     ????????  988 http://[address] [place], [place] [address]line.org  .   Childhelp ????????  [phone] http://ChildPaulding County Hospitalphotline.org   .   ???????  ?952?361-1027????http://Rainn.org   .     ????????  [phone] (??) http://[facility] Centernaway.org  .   ???????  ??/???? [phone] [place]?http://Bill-Ray Home Mobility.org [place]?http://psichapters.com/wi  .   ?????????? ([facility])  800-622-HELP (1664) http://Findtreatment.gov   .                ???????????? Unite Us ??????Unite Us ????????????????????Unite Us ????????????????????????????????    [facility]

## 2024-04-09 NOTE — COMMUNITY RESOURCES LIST (PATIENT PREFERRED LANGUAGE)
April 9, 2024           ????????????           ??????    ????      Free - Client Services  [address] [place] [address] (??: 18.9 ??)  ??: [phone]  ??: https://Keen IO.Autogrid/  ??: ??  ??: ???  ????: ????      Health Link - Housing Stabilization Services  ??: [phone]  ??: https://Spinback/Housing-Stabilization.html  ??: ??  ??: ?? ?? 9:00 - ?? 5:00 ?? ?? 9:00 - ?? 5:00 ?? ?? 9:00 - ?? 5:00 ?? ?? 9:00 - ?? 5:00 ??? ?? 9:00 - ?? 5:00  ??: ??  ?????: ?????????????      [facility] SHELTER  ??: [phone]  ??: https://www.safehavenofracine.org/  ??: ??    ??????      Living - Housing Stabilization Services  [address] [place] [address] (??: 14.7 ??)  ??: [phone]  ??: https://www.Taiga Biotechnologies  ??: ????????????  ??: ?????      Today [place] - Housing Stabilization Services (HSS)  ??: [phone]  ??: https://www.VeveotodayHonorHealth John C. Lincoln Medical Centerpin.net/resources  ??: ??????  ??: ?? ?? 8:00 - ?? 4:00 ?? ?? 8:00 - ?? 4:00 ?? ?? 8:00 - ?? 4:00 ?? ?? 8:00 - ?? 4:00 ??? ?? 8:00 - ?? 4:00  ??: ?????  ?????: ??????????????????????????      [facility] [facility] - Housing Stabilization Services  ??: [phone]  ??: https://Gracelock Industries/  ??: ??  ??: ?? ?? 8:00 - ?? 4:00 ?? ?? 8:00 - ?? 4:00 ?? ?? 8:00 - ?? 4:00 ?? ?? 8:00 - ?? 4:00 ??? ?? 8:00 - ?? 4:00  ??: ???  ?????: ?????????????  ????: ????    ?????????      Chloe Hamblen - [place] [address] (??: 6.1 ??)  ??: https://www.[facility].org/housing  ??: ??  ??: ?????      [facility] [place] Warming or cooling [place] - [facility] - [address] [place] [address] (??: 5.0 ??)  ??: [phone]  ??: ??, ????, ???, ??, ????, ???  ??: ???      LOVE - LAUNDRY LOVE  ??: http://www.Hoppit.org               ???????        ????  911  .   ????  211 http://211unSpill Inc.org  .   ????  ([phone] [phone] http://mnpoison.org  http://wisconsinpoison.org  .     ????????  988 http://[address] [place], [place] [address]line.org  .   Childhelp ????????  [phone] http://ChildOhioHealth Marion General Hospitalphotline.org   .   ???????  ?[phone]?882-4185????http://Rainn.org   .     ????????  [phone]) [phone] (??) http://[facility] Centernaway.org  .   ???????  ??/???? [phone] [place]?http://China Smart Hotels Management.org [place]?http://psichapters.com/wi  .   ?????????? ([facility])  800-622-HELP (3561) http://Findtreatment.gov   .                ???????????? Unite Us ??????Unite Us ????????????????????Unite Us ????????????????????????????????    [facility]

## 2024-04-09 NOTE — COMMUNITY RESOURCES LIST (ENGLISH)
April 9, 2024           YOUR PERSONALIZED LIST OF SERVICES & PROGRAMS           & SHELTER    Housing      Free - Client Services  [address] [place] [address] (Distance: 18.9 miles)  Phone: [phone]  Website: https://Ketchuppp/  Language: English  Fee: Free  Transportation Options: Free transportation      Health Link - Housing Stabilization Services  Phone: [phone]  Website: https://Docphin/Housing-Stabilization.html  Language: English  Hours: Mon 9:00 AM - 5:00 PM Tue 9:00 AM - 5:00 PM Wed 9:00 AM - 5:00 PM Thu 9:00 AM - 5:00 PM Fri 9:00 AM - 5:00 PM  Fee: Insurance  Accessibility: Deaf or hard of hearing, Translation services      [facility] longterm  Phone: [phone]  Website: https://www.Frock Advisor.org/  Language: English    Case Management      Living - Housing Stabilization Services  [address] [place] [address] (Distance: 14.7 miles)  Phone: [phone]  Website: https://BigCalc  Language: Greek, English, Belgian  Fee: Insurance, Self pay      Today [place] - Housing Stabilization Services (HSS)  Phone: [phone]  Website: https://www.Quiet Logistics.SampleBoard/resources  Language: English, Uzbek  Hours: Mon 8:00 AM - 4:00 PM Tue 8:00 AM - 4:00 PM Wed 8:00 AM - 4:00 PM Thu 8:00 AM - 4:00 PM Fri 8:00 AM - 4:00 PM  Fee: Free, Insurance  Accessibility: Ada accessible, Blind accommodation, Deaf or hard of hearing, Translation services      [facility] [facility] - Housing Stabilization Services  Phone: [phone]  Website: https://homebasemn.com/  Language: English  Hours: Mon 8:00 AM - 4:00 PM Tue 8:00 AM - 4:00 PM Wed 8:00 AM - 4:00 PM Thu 8:00 AM - 4:00 PM Fri 8:00 AM - 4:00 PM  Fee: Free  Accessibility: Blind accommodation, Deaf or hard of hearing  Transportation Options: Free transportation    Drop-In Services      [facility] [place] [address] (Distance:  6.1 miles)  Website: https://www.onGroupspeak.org/housing  Language: English  Fee: Free, Self pay      [facility] Library - Warming or cooling center - [facility] - Chloe Ivy Library  [address] [place] [address] (Distance: 5.0 miles)  Phone: [phone]  Language: English, Malian, Amber, Jordanian, St [place]ian, Guyanese  Fee: Free      LOVE - LAUNDRY LOVE  Website: http://www.laundrylove.org               IMPORTANT NUMBERS & WEBSITES        Emergency Services  911  .   United Way  211 http://211unitedway.org  .   Poison Control  (267) 990-2502 http://mnpoison.org http://wisconsinpoison.org  .     Suicide and Crisis Lifeline  988 http://988lifeline.org  .   Childhelp [place] Child Abuse Hotline  888.944.5982 http://Childhelphotline.org   .   [place] Sexual Assault Hotline  (202) 101-3369 (HOPE) http://Berry Kitchenn.org   .     National Runaway Safeline  (181) 592-9036 (RUNAWAY) http://Horrance.Datavail  .   Pregnancy & Postpartum Support  Call/text 879-036-2577  [place]: http://ppsupportmn.org  [place]: http://Over 40 Females.com/wi  .   Substance Abuse National Helpline ([facility])  060-286-HELP (4981) http://Findtreatment.gov   .                DISCLAIMER: These resources have been generated via the Qual Canal Platform. Qual Canal does not endorse any service providers mentioned in this resource list. Qual Canal does not guarantee that the services mentioned in this resource list will be available to you or will improve your health or wellness.    [facility]

## 2024-04-12 ENCOUNTER — OFFICE VISIT (OUTPATIENT)
Dept: FAMILY MEDICINE | Facility: CLINIC | Age: 49
End: 2024-04-12
Payer: COMMERCIAL

## 2024-04-12 VITALS
HEIGHT: 63 IN | OXYGEN SATURATION: 100 % | SYSTOLIC BLOOD PRESSURE: 111 MMHG | HEART RATE: 52 BPM | RESPIRATION RATE: 14 BRPM | DIASTOLIC BLOOD PRESSURE: 73 MMHG | TEMPERATURE: 97.2 F | WEIGHT: 108 LBS | BODY MASS INDEX: 19.14 KG/M2

## 2024-04-12 DIAGNOSIS — E78.00 HYPERCHOLESTEROLEMIA: ICD-10-CM

## 2024-04-12 DIAGNOSIS — B19.20 HEPATITIS C VIRUS INFECTION WITHOUT HEPATIC COMA, UNSPECIFIED CHRONICITY: ICD-10-CM

## 2024-04-12 DIAGNOSIS — Z00.00 ROUTINE GENERAL MEDICAL EXAMINATION AT A HEALTH CARE FACILITY: Primary | ICD-10-CM

## 2024-04-12 DIAGNOSIS — E46 PROTEIN-CALORIE MALNUTRITION, UNSPECIFIED SEVERITY (H): ICD-10-CM

## 2024-04-12 LAB
ALBUMIN SERPL BCG-MCNC: 4.5 G/DL (ref 3.5–5.2)
ALP SERPL-CCNC: 41 U/L (ref 40–150)
ALT SERPL W P-5'-P-CCNC: 10 U/L (ref 0–50)
ANION GAP SERPL CALCULATED.3IONS-SCNC: 10 MMOL/L (ref 7–15)
AST SERPL W P-5'-P-CCNC: 19 U/L (ref 0–45)
BILIRUB SERPL-MCNC: 0.5 MG/DL
BUN SERPL-MCNC: 9.6 MG/DL (ref 6–20)
CALCIUM SERPL-MCNC: 9.4 MG/DL (ref 8.6–10)
CHLORIDE SERPL-SCNC: 103 MMOL/L (ref 98–107)
CHOLEST SERPL-MCNC: 257 MG/DL
CREAT SERPL-MCNC: 0.56 MG/DL (ref 0.51–0.95)
DEPRECATED HCO3 PLAS-SCNC: 26 MMOL/L (ref 22–29)
EGFRCR SERPLBLD CKD-EPI 2021: >90 ML/MIN/1.73M2
ERYTHROCYTE [DISTWIDTH] IN BLOOD BY AUTOMATED COUNT: 12.4 % (ref 10–15)
FASTING STATUS PATIENT QL REPORTED: YES
GLUCOSE SERPL-MCNC: 97 MG/DL (ref 70–99)
HCT VFR BLD AUTO: 41.4 % (ref 35–47)
HDLC SERPL-MCNC: 74 MG/DL
HGB BLD-MCNC: 13.7 G/DL (ref 11.7–15.7)
LDLC SERPL CALC-MCNC: 165 MG/DL
MCH RBC QN AUTO: 31.2 PG (ref 26.5–33)
MCHC RBC AUTO-ENTMCNC: 33.1 G/DL (ref 31.5–36.5)
MCV RBC AUTO: 94 FL (ref 78–100)
NONHDLC SERPL-MCNC: 183 MG/DL
PLATELET # BLD AUTO: 224 10E3/UL (ref 150–450)
POTASSIUM SERPL-SCNC: 4 MMOL/L (ref 3.4–5.3)
PROT SERPL-MCNC: 7.3 G/DL (ref 6.4–8.3)
RBC # BLD AUTO: 4.39 10E6/UL (ref 3.8–5.2)
SODIUM SERPL-SCNC: 139 MMOL/L (ref 135–145)
TRIGL SERPL-MCNC: 90 MG/DL
TSH SERPL DL<=0.005 MIU/L-ACNC: 2 UIU/ML (ref 0.3–4.2)
WBC # BLD AUTO: 6.3 10E3/UL (ref 4–11)

## 2024-04-12 PROCEDURE — 84443 ASSAY THYROID STIM HORMONE: CPT | Performed by: INTERNAL MEDICINE

## 2024-04-12 PROCEDURE — 80061 LIPID PANEL: CPT | Performed by: INTERNAL MEDICINE

## 2024-04-12 PROCEDURE — 85027 COMPLETE CBC AUTOMATED: CPT | Performed by: INTERNAL MEDICINE

## 2024-04-12 PROCEDURE — 80053 COMPREHEN METABOLIC PANEL: CPT | Performed by: INTERNAL MEDICINE

## 2024-04-12 PROCEDURE — 99396 PREV VISIT EST AGE 40-64: CPT | Performed by: INTERNAL MEDICINE

## 2024-04-12 PROCEDURE — 36415 COLL VENOUS BLD VENIPUNCTURE: CPT | Performed by: INTERNAL MEDICINE

## 2024-04-12 PROCEDURE — 99213 OFFICE O/P EST LOW 20 MIN: CPT | Mod: 25 | Performed by: INTERNAL MEDICINE

## 2024-04-12 SDOH — HEALTH STABILITY: PHYSICAL HEALTH: ON AVERAGE, HOW MANY MINUTES DO YOU ENGAGE IN EXERCISE AT THIS LEVEL?: 70 MIN

## 2024-04-12 SDOH — HEALTH STABILITY: PHYSICAL HEALTH: ON AVERAGE, HOW MANY DAYS PER WEEK DO YOU ENGAGE IN MODERATE TO STRENUOUS EXERCISE (LIKE A BRISK WALK)?: 3 DAYS

## 2024-04-12 ASSESSMENT — SOCIAL DETERMINANTS OF HEALTH (SDOH): HOW OFTEN DO YOU GET TOGETHER WITH FRIENDS OR RELATIVES?: ONCE A WEEK

## 2024-04-12 ASSESSMENT — PAIN SCALES - GENERAL: PAINLEVEL: NO PAIN (0)

## 2024-04-12 NOTE — PROGRESS NOTES
Preventive Care Visit  [facility] FIORELLA Murguia MD, Internal Medicine  Apr 12, 2024        Assessment and Plan  1. Routine general medical examination at a health care facility    Last seen patient in April 2023 for annual physical, she is here for the same.  Lab work done at that time showing dyslipidemia with LDLs at 143, normal CMP and CBC.      Patient was started on simvastatin which pt states refills were over and did not know that she is supposed to continue and didn't reach back on the follow up as instructed.  Will recheck at this time.      - REVIEW OF HEALTH MAINTENANCE PROTOCOL ORDERS  - Lipid panel reflex to direct LDL Non-fasting; Future  - Comprehensive metabolic panel (BMP + Alb, Alk Phos, ALT, AST, Total. Bili, TP); Future  - CBC with platelets; Future  - TSH with free T4 reflex; Future    2. Protein-calorie malnutrition, unspecified severity (H24)  - CBC with platelets; Future    3. Body mass index (BMI) 19.9 or less, adult  - CBC with platelets; Future  - TSH with free T4 reflex; Future    4. Hepatitis C virus infection without hepatic coma, unspecified chronicity  - Comprehensive metabolic panel (BMP + Alb, Alk Phos, ALT, AST, Total. Bili, TP); Future    5. Hypercholesterolemia  Uncontrolled, due to noncompliance of previously prescribed medication. Will recommend to start off on medication given worsening LDL. Follow up instructions given   - Lipid panel reflex to direct LDL Non-fasting; Future  - simvastatin (ZOCOR) 10 MG tablet; Take 1 tablet (10 mg) by mouth at bedtime  Dispense: 90 tablet; Refill: 1      Please note that this note consists of symbols derived from keyboarding, dictation and/or voice recognition software. As a result, there may be errors in the script that have gone undetected. Please consider this when interpreting information found in this chart.    Patient Instructions   As discussed, please do fasting labs placed.      =================================  Preventive Care Advice   This is general advice given by our system to help you stay healthy. However, your care team may have specific advice just for you. Please talk to your care team about your preventive care needs.  Nutrition  Eat 5 or more servings of fruits and vegetables each day.  Try wheat bread, brown rice and whole grain pasta (instead of white bread, rice, and pasta).  Get enough calcium and vitamin D. Check the label on foods and aim for 100% of the RDA (recommended daily allowance).  Lifestyle  Exercise at least 150 minutes each week   (30 minutes a day, 5 days a week).  Do muscle strengthening activities 2 days a week. These help control your weight and prevent disease.  No smoking.  Wear sunscreen to prevent skin cancer.  Have a dental exam and cleaning every 6 months.  Yearly exams  See your health care team every year to talk about:  Any changes in your health.  Any medicines your care team has prescribed.  Preventive care, family planning, and ways to prevent chronic diseases.  Shots (vaccines)   HPV shots (up to age 26), if you've never had them before.  Hepatitis B shots (up to age 59), if you've never had them before.  COVID-19 shot: Get this shot when it's due.  Flu shot: Get a flu shot every year.  Tetanus shot: Get a tetanus shot every 10 years.  Pneumococcal, hepatitis A, and RSV shots: Ask your care team if you need these based on your risk.  Shingles shot (for age 50 and up).  General health tests  Diabetes screening:  Starting at age 35, Get screened for diabetes at least every 3 years.  If you are younger than age 35, ask your care team if you should be screened for diabetes.  Cholesterol test: At age 39, start having a cholesterol test every 5 years, or more often if advised.  Bone density scan (DEXA): At age 50, ask your care team if you should have this scan for osteoporosis (brittle bones).  Hepatitis C: Get tested at least once in your  life.  STIs (sexually transmitted infections)  Before age 24: Ask your care team if you should be screened for STIs.  After age 24: Get screened for STIs if you're at risk. You are at risk for STIs (including HIV) if:  You are sexually active with more than one person.  You don't use condoms every time.  You or a partner was diagnosed with a sexually transmitted infection.  If you are at risk for HIV, ask about PrEP medicine to prevent HIV.  Get tested for HIV at least once in your life, whether you are at risk for HIV or not.  Cancer screening tests  Cervical cancer screening: If you have a cervix, begin getting regular cervical cancer screening tests at age 21. Most people who have regular screenings with normal results can stop after age 65. Talk about this with your provider.  Breast cancer scan (mammogram): If you've ever had breasts, begin having regular mammograms starting at age 40. This is a scan to check for breast cancer.  Colon cancer screening: It is important to start screening for colon cancer at age 45.  Have a colonoscopy test every 10 years (or more often if you're at risk) Or, ask your provider about stool tests like a FIT test every year or Cologuard test every 3 years.  To learn more about your testing options, visit: https://www.Reviews42/394476.pdf.  For help making a decision, visit: https://bit.ly/iw61881.  Prostate cancer screening test: If you have a prostate and are age 55 to 69, ask your provider if you would benefit from a yearly prostate cancer screening test.  Lung cancer screening: If you are a current or former smoker age 50 to 80, ask your care team if ongoing lung cancer screenings are right for you.  For informational purposes only. Not to replace the advice of your health care provider. Copyright   2023 [employer] All rights reserved. Clinically reviewed by the [facility] Transitions Program. [employer] [id number] - REV 01/24.    Return in about 3 months  (around 7/12/2024), or if symptoms worsen or fail to improve, for dyslipidemia, If symptoms persist, Follow up of last visit.    Katelynn Murguia MD  [facility] FIORELLA Lovelace is a 48 year old, presenting for the following:  Physical (Fasting.)        4/12/2024     6:59 AM   Additional Questions   Roomed by Darline MOON        Health Care Directive  Patient does not have a Health Care Directive or Living Will: Discussed advance care planning with patient; however, patient declined at this time.    HPI        4/12/2024   General Health   How would you rate your overall physical health? Good   Feel stress (tense, anxious, or unable to sleep) Not at all         4/12/2024   Nutrition   Three or more servings of calcium each day? Yes   Diet: Regular (no restrictions)   How many servings of fruit and vegetables per day? (!) 2-3   How many sweetened beverages each day? 0-1         4/12/2024   Exercise   Days per week of moderate/strenous exercise 3 days   Average minutes spent exercising at this level 70 min         4/12/2024   Social Factors   Frequency of gathering with friends or relatives Once a week   Worry food won't last until get money to buy more No   Food not last or not have enough money for food? No   Do you have housing?  No   Are you worried about losing your housing? No   Lack of transportation? No   Unable to get utilities (heat,electricity)? No   Want help with housing or utility concern? No   (!) HOUSING CONCERN PRESENT      4/12/2024   Dental   Dentist two times every year? Yes            Today's PHQ-2 Score:       4/12/2024     6:49 AM   PHQ-2 ( 1999 Pf[employer])   Q1: Little interest or pleasure in doing things 0   Q2: Feeling down, depressed or hopeless 0   PHQ-2 Score 0   Q1: Little interest or pleasure in doing things Not at all   Q2: Feeling down, depressed or hopeless Not at all   PHQ-2 Score 0           4/12/2024   Substance Use   Alcohol more than 3/day or more  than 7/wk No   Do you use any other substances recreationally? No     Social History     Tobacco Use    Smoking status: Never    Smokeless tobacco: Never   Vaping Use    Vaping status: Never Used   Substance Use Topics    Alcohol use: No    Drug use: No             4/12/2024   Breast Cancer Screening   Family history of breast, colon, or ovarian cancer? No / Unknown         9/15/2023   LAST FHS-7 RESULTS   1st degree relative breast or ovarian cancer No   Any relative bilateral breast cancer No   Any male have breast cancer No   Any ONE woman have BOTH breast AND ovarian cancer No   Any woman with breast cancer before 50yrs No   2 or more relatives with breast AND/OR ovarian cancer No   2 or more relatives with breast AND/OR bowel cancer No        Mammogram Screening - Annual screen due to greater than 20% lifetime risk as estimated by Breast Cancer Risk Calculator        4/12/2024   STI Screening   New sexual partner(s) since last STI/HIV test? No     History of abnormal Pap smear: NO - age 30-65 PAP every 5 years with negative HPV co-testing recommended        Latest Ref Rng & Units 6/2/2016     9:53 AM 6/2/2016    12:00 AM   PAP / HPV   PAP (Historical)   NIL    HPV 16 DNA NEG Negative     HPV 18 DNA NEG Negative     Other HR HPV NEG Negative       ASCVD Risk   The 10-year ASCVD risk score (Maritza HUGHES, et al., 2019) is: 0.7%    Values used to calculate the score:      Age: 48 years      Sex: Female      Is Non- : No      Diabetic: No      Tobacco smoker: No      Systolic Blood Pressure: 111 mmHg      Is BP treated: No      HDL Cholesterol: 74 mg/dL      Total Cholesterol: 257 mg/dL       Reviewed and updated as needed this visit by Provider   Tobacco  Allergies  Meds  Problems  Med Hx  Surg Hx  Fam Hx            Past Medical History:   Diagnosis Date    Hepatitis C     Treatment successful, considered cured April 2017     Past Surgical History:   Procedure Laterality Date     ABDOMEN SURGERY  10/2006    liver biopsy - Hep C     COLONOSCOPY N/A 9/15/2022    Procedure: COLONOSCOPY;  Surgeon: Sydnie Arciniega MD;  Location: [facility]    HYSTERECTOMY, PAP NO LONGER INDICATED  06/24/10    vaginal-for uterine prolapse  left ovaries, no cervix     OB History   No obstetric history on file.     Lab work is in process  Labs reviewed in EPIC  BP Readings from Last 3 Encounters:   04/12/24 111/73   04/28/23 125/79   09/15/22 116/68    Wt Readings from Last 3 Encounters:   04/12/24 49 kg (108 lb)   04/28/23 50.3 kg (111 lb)   09/15/22 46.3 kg (102 lb)                  Patient Active Problem List   Diagnosis    Hypercholesterolemia    Low back pain    Lesion of mucosa    Protein-calorie malnutrition (H)-stable    Family history of ischemic heart disease    Hepatitis C virus infection     Past Surgical History:   Procedure Laterality Date    ABDOMEN SURGERY  10/2006    liver biopsy - Hep C     COLONOSCOPY N/A 9/15/2022    Procedure: COLONOSCOPY;  Surgeon: Sydnie Arciniega MD;  Location:  GI    HYSTERECTOMY, PAP NO LONGER INDICATED  06/24/10    vaginal-for uterine prolapse  left ovaries, no cervix       Social History     Tobacco Use    Smoking status: Never    Smokeless tobacco: Never   Substance Use Topics    Alcohol use: No     Family History   Problem Relation Age of Onset    Heart Disease Father     Cancer Father     Unknown/Adopted Mother     Diabetes Mother     Hypertension Mother     Hyperlipidemia Mother     Cerebrovascular Disease Mother     Other Cancer Mother     Coronary Artery Disease No family hx of     Breast Cancer No family hx of     Colon Cancer No family hx of     Prostate Cancer No family hx of     Depression No family hx of     Anxiety Disorder No family hx of     Mental Illness No family hx of     Substance Abuse No family hx of     Anesthesia Reaction No family hx of     Asthma No family hx of     Osteoporosis No family hx of     Genetic Disorder No family hx of      "Thyroid Disease No family hx of     Obesity No family hx of          Current Outpatient Medications   Medication Sig Dispense Refill    Cholecalciferol (VITAMIN D) 2000 UNITS CAPS Take 1 capsule by mouth daily      multivitamin w/minerals (THERA-VIT-M) tablet Take 1 tablet by mouth daily      simvastatin (ZOCOR) 10 MG tablet Take 1 tablet (10 mg) by mouth at bedtime 90 tablet 1     Allergies   Allergen Reactions    Pollen Extract      dandilions    Seasonal Allergies      dandilions     Recent Labs   Lab Test 04/12/24  0732 04/28/23  0747 06/10/22  1034 06/10/22  1034 06/04/21  0845 07/23/20  0829   * 143*  --  120* 123* 139*   HDL 74 70  --  78 74 86   TRIG 90 90  --  108 85 68   ALT 10 15  --  14 20 17   CR 0.56 0.63   < > 0.63 0.57 0.60   GFRESTIMATED >90 >90   < > >90 >90 >90   GFRESTBLACK  --   --   --   --  >90 >90   POTASSIUM 4.0 3.8  --  3.7 3.6 3.5   TSH 2.00  --   --   --  1.10 1.32    < > = values in this interval not displayed.          Review of Systems  Constitutional, HEENT, cardiovascular, pulmonary, GI, , musculoskeletal, neuro, skin, endocrine and psych systems are negative, except as otherwise noted.     Objective    Exam  /73   Pulse 52   Temp 97.2  F (36.2  C) (Temporal)   Resp 14   Ht 1.595 m (5' 2.8\")   Wt 49 kg (108 lb)   LMP  (LMP Unknown)   SpO2 100%   BMI 19.26 kg/m     Estimated body mass index is 19.26 kg/m  as calculated from the following:    Height as of this encounter: 1.595 m (5' 2.8\").    Weight as of this encounter: 49 kg (108 lb).    Physical Exam  GENERAL: alert and no distress  EYES: Eyes grossly normal to inspection, PERRL and conjunctivae and sclerae normal  HENT: ear canals and TM's normal, nose and mouth without ulcers or lesions  NECK: no adenopathy, no asymmetry, masses, or scars  RESP: lungs clear to auscultation - no rales, rhonchi or wheezes  BREAST: Deferred , Mammogram  CV: regular rate and rhythm, normal S1 S2, no S3 or S4, no murmur, click or " rub, no peripheral edema  ABDOMEN: soft, nontender, no hepatosplenomegaly, no masses and bowel sounds normal  MS: no gross musculoskeletal defects noted, no edema  SKIN: no suspicious lesions or rashes  NEURO: Normal strength and tone, mentation intact and speech normal  PSYCH: mentation appears normal, affect normal/bright        Signed Electronically by: Katelynn Murguia MD

## 2024-04-12 NOTE — COMMUNITY RESOURCES LIST (ENGLISH)
April 12, 2024           YOUR PERSONALIZED LIST OF SERVICES & PROGRAMS           & SHELTER    Housing      Free - Client Services  [address] [place] [address] (Distance: 18.9 miles)  Phone: [phone]  Website: https://Sigmascreening/  Language: English  Fee: Free  Transportation Options: Free transportation      [facility] - Housing Stabilization Services  Phone: [phone] [phone]  Website: https://[employer]/Housing-Stabilization.html  Language: English  Hours: Mon 9:00 AM - 5:00 PM Tue 9:00 AM - 5:00 PM Wed 9:00 AM - 5:00 PM Thu 9:00 AM - 5:00 PM Fri 9:00 AM - 5:00 PM  Fee: Insurance  Accessibility: Deaf or hard of hearing, Translation services      [facility] [facility] long term  Phone: [phone]  Website: https://www.Kasenna.org/  Language: English    Case Management      Living - Housing Stabilization Services  [address] [place] [address] (Distance: 14.7 miles)  Phone: [phone]  Website: https://Spoofem.com  Language: Slovenian, English, Qatari  Fee: Insurance, Self pay      [facility] [place] - Housing Stabilization Services (HSS)  Phone: [phone]  Website: https://www.Oximity.Music[employer] Analytics/resources  Language: English, Guatemalan  Hours: Mon 8:00 AM - 4:00 PM Tue 8:00 AM - 4:00 PM Wed 8:00 AM - 4:00 PM Thu 8:00 AM - 4:00 PM Fri 8:00 AM - 4:00 PM  Fee: Free, Insurance  Accessibility: [place] accessible, Blind accommodation, Deaf or hard of hearing, Translation services      [employer] - Housing Stabilization Services  Phone: [phone]  Website: https://homebasemn.com/  Language: English  Hours: Mon 8:00 AM - 4:00 PM Tue 8:00 AM - 4:00 PM Wed 8:00 AM - 4:00 PM Thu 8:00 AM - 4:00 PM Fri 8:00 AM - 4:00 PM  Fee: Free  Accessibility: Blind accommodation, Deaf or hard of hearing  Transportation Options: Free transportation    Drop-In Services      [place] [address] (Distance:  6.1 miles)  Website: https://www.onKaldoora.org/housing  Language: English  Fee: Free, Self pay      [facility] - Warming or cooling center - [facility] - [place] [facility]  [address] [place] [address] (Distance: 5.0 miles)  Phone: [phone]  Language: English, Surinamese, Amber, Chadian, St Helenian, Chinese  Fee: Free      LOVE - LAUNDRY LOVE  Website: http://www.laundrylove.org               IMPORTANT NUMBERS & WEBSITES        Emergency Services  911  .   United Way  211 http://211unitedway.org  .   Poison Control  (244) 101-2981 http://[place]poison.org http://wisconsinpoison.org  .     Suicide and Crisis Lifeline  988 http://988lifeline.org  .   [employer] Child Abuse Hotline  626.388.1340 http://Childhelphotline.org   .   [place] Sexual Assault Hotline  (946) 267-6819 (HOPE) http://Matchmaker Videosn.org   .     National Runaway Safeline  (885) 172-3319 (RUNAWAY) http://smsPREP.Silent Communication  .   Pregnancy & Postpartum Support  Call/text 496-199-5435  [place]: http://ppsupportmn.org  [place]: http://AdverCar.com/wi  .   Substance Abuse National Helpline ([facility])  311-457-HELP (8971) http://Findtreatment.gov   .                DISCLAIMER: These resources have been generated via the [employer]. [employer] does not endorse any service providers mentioned in this resource list. [employer] does not guarantee that the services mentioned in this resource list will be available to you or will improve your health or wellness.    [facility]

## 2024-04-12 NOTE — PATIENT INSTRUCTIONS
As discussed, please do fasting labs placed.     =================================  Preventive Care Advice   This is general advice given by our system to help you stay healthy. However, your care team may have specific advice just for you. Please talk to your care team about your preventive care needs.  Nutrition  Eat 5 or more servings of fruits and vegetables each day.  Try wheat bread, brown rice and whole grain pasta (instead of white bread, rice, and pasta).  Get enough calcium and vitamin D. Check the label on foods and aim for 100% of the RDA (recommended daily allowance).  Lifestyle  Exercise at least 150 minutes each week   (30 minutes a day, 5 days a week).  Do muscle strengthening activities 2 days a week. These help control your weight and prevent disease.  No smoking.  Wear sunscreen to prevent skin cancer.  Have a dental exam and cleaning every 6 months.  Yearly exams  See your health care team every year to talk about:  Any changes in your health.  Any medicines your care team has prescribed.  Preventive care, family planning, and ways to prevent chronic diseases.  Shots (vaccines)   HPV shots (up to age 26), if you've never had them before.  Hepatitis B shots (up to age 59), if you've never had them before.  COVID-19 shot: Get this shot when it's due.  Flu shot: Get a flu shot every year.  Tetanus shot: Get a tetanus shot every 10 years.  Pneumococcal, hepatitis A, and RSV shots: Ask your care team if you need these based on your risk.  Shingles shot (for age 50 and up).  General health tests  Diabetes screening:  Starting at age 35, Get screened for diabetes at least every 3 years.  If you are younger than age 35, ask your care team if you should be screened for diabetes.  Cholesterol test: At age 39, start having a cholesterol test every 5 years, or more often if advised.  Bone density scan (DEXA): At age 50, ask your care team if you should have this scan for osteoporosis (brittle  bones).  Hepatitis C: Get tested at least once in your life.  STIs (sexually transmitted infections)  Before age 24: Ask your care team if you should be screened for STIs.  After age 24: Get screened for STIs if you're at risk. You are at risk for STIs (including HIV) if:  You are sexually active with more than one person.  You don't use condoms every time.  You or a partner was diagnosed with a sexually transmitted infection.  If you are at risk for HIV, ask about PrEP medicine to prevent HIV.  Get tested for HIV at least once in your life, whether you are at risk for HIV or not.  Cancer screening tests  Cervical cancer screening: If you have a cervix, begin getting regular cervical cancer screening tests at age 21. Most people who have regular screenings with normal results can stop after age 65. Talk about this with your provider.  Breast cancer scan (mammogram): If you've ever had breasts, begin having regular mammograms starting at age 40. This is a scan to check for breast cancer.  Colon cancer screening: It is important to start screening for colon cancer at age 45.  Have a colonoscopy test every 10 years (or more often if you're at risk) Or, ask your provider about stool tests like a FIT test every year or Cologuard test every 3 years.  To learn more about your testing options, visit: https://www.[employer]/970839.pdf.  For help making a decision, visit: https://bit.ly/pp35723.  Prostate cancer screening test: If you have a prostate and are age 55 to 69, ask your provider if you would benefit from a yearly prostate cancer screening test.  Lung cancer screening: If you are a current or former smoker age 50 to 80, ask your care team if ongoing lung cancer screenings are right for you.  For informational purposes only. Not to replace the advice of your health care provider. Copyright   2023 [employer]. All rights reserved. Clinically reviewed by the [facility] Transitions Program.  [employer] [id number] - REV 01/24.

## 2024-04-12 NOTE — COMMUNITY RESOURCES LIST (PATIENT PREFERRED LANGUAGE)
April 12, 2024           ????????????           ??????    ????      Free - Client Services  [address] [place] [address] (??: 18.9 ??)  ??: [phone]  ??: [email or web address]  ??: ??  ??: ???  ????: ????      [facility] - Housing Stabilization Services  ??: [phone]  ??: [email or web address]  ??: ??  ??: ?? ?? 9:00 - ?? 5:00 ?? ?? 9:00 - ?? 5:00 ?? ?? 9:00 - ?? 5:00 ?? ?? 9:00 - ?? 5:00 ??? ?? 9:00 - ?? 5:00  ??: ??  ?????: ?????????????      [employer] [facility]  ??: [phone]  ??: [email or web address]  ??: ??    ??????      Living - Housing Stabilization Services  [address] [place] [address] (??: 14.7 ??)  ??: [phone]  ??: [email or web address]  ??: ????????????  ??: ?????      [facility] - Housing Stabilization Services (HSS)  ??: [phone]  ??: [email or web address] [email or web address]/resources  ??: ??????  ??: ?? ?? 8:00 - ?? 4:00 ?? ?? 8:00 - ?? 4:00 ?? ?? 8:00 - ?? 4:00 ?? ?? 8:00 - ?? 4:00 ??? ?? 8:00 - ?? 4:00  ??: ?????  ?????: ??????????????????????????      [employer] - Housing Stabilization Services  ??: [phone]  ??: [email or web address]  ??: ??  ??: ?? ?? 8:00 - ?? 4:00 ?? ?? 8:00 - ?? 4:00 ?? ?? 8:00 - ?? 4:00 ?? ?? 8:00 - ?? 4:00 ??? ?? 8:00 - ?? 4:00  ??: ???  ?????: ?????????????  ????: ????    ?????????      [facility] - [facility] [address] (??: 6.1 ??)  ??: [email or web address]  ??: ??  ??: ?????      [facility] - Warming or cooling [place] - [facility] - [address] [place] [address] (??: 5.0 ??)  ??: [phone]  ??: ??, ????, ???, ??, ????, ???  ??: ???      LOVE - LAUNDRY LOVE  ??: [email or web address]://[email or web address]               ???????        ????  911  .   ????  211 [email or web address]  .   ????  [phone] http://[email or web address]  http://[email or web address]  .     ????????  988 http://[address] [place], [place] [address]line.org  .   [employer] ????????  [phone] http://ChildSt. Charles Hospitalphotline.org   .   ???????  ?[phone]????http://[email or web address]   .     ????????  [phone] (??) http://[facility] Mex[facility]away.org  .   ???????  ??/???? [phone] [place]?http://[email or web address] [place]?[email or web address]  .   ?????????? ([facility])  800-622-HELP ([phone]) http://Findtreatment.gov   .                ???????????? Unite Us ??????Unite Us ????????????????????Unite Us ????????????????????????????????    [facility]

## 2024-04-13 RX ORDER — SIMVASTATIN 10 MG
10 TABLET ORAL AT BEDTIME
Qty: 90 TABLET | Refills: 1 | Status: SHIPPED | OUTPATIENT
Start: 2024-04-13

## 2024-04-22 ENCOUNTER — E-VISIT (OUTPATIENT)
Dept: FAMILY MEDICINE | Facility: CLINIC | Age: 49
End: 2024-04-22
Payer: COMMERCIAL

## 2024-04-22 DIAGNOSIS — R30.0 DYSURIA: Primary | ICD-10-CM

## 2024-04-22 DIAGNOSIS — N89.8 VAGINAL ITCHING: ICD-10-CM

## 2024-04-22 DIAGNOSIS — R30.0 DIFFICULT OR PAINFUL URINATION: ICD-10-CM

## 2024-04-22 DIAGNOSIS — N39.0 URINARY TRACT INFECTION WITHOUT HEMATURIA, SITE UNSPECIFIED: ICD-10-CM

## 2024-04-22 PROCEDURE — 99422 OL DIG E/M SVC 11-20 MIN: CPT | Performed by: INTERNAL MEDICINE

## 2024-04-22 NOTE — TELEPHONE ENCOUNTER
Provider E-Visit time total (minutes): 16 minutes     Sent in [employer] message as below -       Nghia Lovelace     Sorry to hear that . I have placed urinalysis orders as well as given the itching sensation its better to make a vaginal swab as well to make sure no other vaginal infections which we are missing any. Please make a LAB Only appointment at the earliest to do these so I can send appropriate medication to your pharmacy.     Please let me know if you have any questions.     Thank you  Katelynn Murguia MD on 4/22/2024 at 8:20 AM

## 2024-04-23 ENCOUNTER — LAB (OUTPATIENT)
Dept: LAB | Facility: CLINIC | Age: 49
End: 2024-04-23
Payer: COMMERCIAL

## 2024-04-23 DIAGNOSIS — R30.0 DYSURIA: ICD-10-CM

## 2024-04-23 DIAGNOSIS — N89.8 VAGINAL ITCHING: ICD-10-CM

## 2024-04-23 LAB
ALBUMIN UR-MCNC: NEGATIVE MG/DL
APPEARANCE UR: CLEAR
BILIRUB UR QL STRIP: NEGATIVE
CLUE CELLS: ABNORMAL
COLOR UR AUTO: YELLOW
GLUCOSE UR STRIP-MCNC: NEGATIVE MG/DL
HGB UR QL STRIP: ABNORMAL
KETONES UR STRIP-MCNC: NEGATIVE MG/DL
LEUKOCYTE ESTERASE UR QL STRIP: ABNORMAL
NITRATE UR QL: NEGATIVE
PH UR STRIP: 7 [PH] (ref 5–7)
RBC #/AREA URNS AUTO: NORMAL /HPF
SP GR UR STRIP: 1.01 (ref 1–1.03)
TRICHOMONAS, WET PREP: ABNORMAL
UROBILINOGEN UR STRIP-ACNC: 0.2 E.U./DL
WBC #/AREA URNS AUTO: NORMAL /HPF
WBC'S/HIGH POWER FIELD, WET PREP: ABNORMAL
YEAST, WET PREP: ABNORMAL

## 2024-04-23 PROCEDURE — 81001 URINALYSIS AUTO W/SCOPE: CPT

## 2024-04-23 PROCEDURE — 87210 SMEAR WET MOUNT SALINE/INK: CPT

## 2024-04-23 RX ORDER — NITROFURANTOIN 25; 75 MG/1; MG/1
100 CAPSULE ORAL 2 TIMES DAILY
Qty: 10 CAPSULE | Refills: 0 | Status: SHIPPED | OUTPATIENT
Start: 2024-04-23

## 2024-09-20 ENCOUNTER — HOSPITAL ENCOUNTER (OUTPATIENT)
Dept: MAMMOGRAPHY | Facility: CLINIC | Age: 49
Discharge: HOME | End: 2024-09-20
Attending: INTERNAL MEDICINE | Admitting: INTERNAL MEDICINE
Payer: COMMERCIAL

## 2024-09-20 DIAGNOSIS — Z12.31 VISIT FOR SCREENING MAMMOGRAM: ICD-10-CM

## 2024-09-20 PROCEDURE — 77063 BREAST TOMOSYNTHESIS BI: CPT

## 2024-10-16 DIAGNOSIS — E78.00 HYPERCHOLESTEROLEMIA: ICD-10-CM

## 2024-10-17 RX ORDER — SIMVASTATIN 10 MG
10 TABLET ORAL AT BEDTIME
Qty: 90 TABLET | Refills: 0 | Status: SHIPPED | OUTPATIENT
Start: 2024-10-17

## (undated) RX ORDER — FENTANYL CITRATE 50 UG/ML
INJECTION, SOLUTION INTRAMUSCULAR; INTRAVENOUS
Status: DISPENSED
Start: 2022-09-15